# Patient Record
Sex: FEMALE | Race: WHITE | NOT HISPANIC OR LATINO | URBAN - METROPOLITAN AREA
[De-identification: names, ages, dates, MRNs, and addresses within clinical notes are randomized per-mention and may not be internally consistent; named-entity substitution may affect disease eponyms.]

---

## 2017-09-19 ENCOUNTER — FOLLOW UP (OUTPATIENT)
Dept: URBAN - METROPOLITAN AREA CLINIC 33 | Facility: CLINIC | Age: 67
End: 2017-09-19

## 2017-09-19 VITALS — HEART RATE: 98 BPM | DIASTOLIC BLOOD PRESSURE: 78 MMHG | HEIGHT: 55 IN | SYSTOLIC BLOOD PRESSURE: 142 MMHG

## 2017-09-19 DIAGNOSIS — H43.393: ICD-10-CM

## 2017-09-19 DIAGNOSIS — H35.371: ICD-10-CM

## 2017-09-19 DIAGNOSIS — E11.3293: ICD-10-CM

## 2017-09-19 DIAGNOSIS — H25.13: ICD-10-CM

## 2017-09-19 PROCEDURE — G8427 DOCREV CUR MEDS BY ELIG CLIN: HCPCS

## 2017-09-19 PROCEDURE — G8417 CALC BMI ABV UP PARAM F/U: HCPCS

## 2017-09-19 PROCEDURE — 2021F DILAT MACULAR EXAM DONE: CPT

## 2017-09-19 PROCEDURE — G8397 DIL MACULA/FUNDUS EXAM/W DOC: HCPCS

## 2017-09-19 PROCEDURE — 92250 FUNDUS PHOTOGRAPHY W/I&R: CPT

## 2017-09-19 PROCEDURE — 92235 FLUORESCEIN ANGRPH MLTIFRAME: CPT

## 2017-09-19 PROCEDURE — 2022F DILAT RTA XM EVC RTNOPTHY: CPT

## 2017-09-19 PROCEDURE — 1036F TOBACCO NON-USER: CPT

## 2017-09-19 PROCEDURE — 92014 COMPRE OPH EXAM EST PT 1/>: CPT

## 2017-09-19 PROCEDURE — 5010F MACUL RESULT PHY/QHP MNG DM: CPT

## 2017-09-19 ASSESSMENT — VISUAL ACUITY
OS_SC: 20/25
OD_SC: 20/25-2

## 2017-09-19 ASSESSMENT — TONOMETRY
OS_IOP_MMHG: 18
OD_IOP_MMHG: 18

## 2018-08-27 ENCOUNTER — FOLLOW UP (OUTPATIENT)
Dept: URBAN - METROPOLITAN AREA CLINIC 33 | Facility: CLINIC | Age: 68
End: 2018-08-27

## 2018-08-27 VITALS
SYSTOLIC BLOOD PRESSURE: 140 MMHG | WEIGHT: 185 LBS | BODY MASS INDEX: 34.93 KG/M2 | HEART RATE: 66 BPM | HEIGHT: 61 IN | DIASTOLIC BLOOD PRESSURE: 88 MMHG

## 2018-08-27 DIAGNOSIS — E11.3293: ICD-10-CM

## 2018-08-27 DIAGNOSIS — H35.371: ICD-10-CM

## 2018-08-27 DIAGNOSIS — H43.393: ICD-10-CM

## 2018-08-27 PROCEDURE — 92235 FLUORESCEIN ANGRPH MLTIFRAME: CPT

## 2018-08-27 PROCEDURE — 92014 COMPRE OPH EXAM EST PT 1/>: CPT

## 2018-08-27 PROCEDURE — 92250 FUNDUS PHOTOGRAPHY W/I&R: CPT

## 2018-08-27 ASSESSMENT — TONOMETRY
OS_IOP_MMHG: 11
OD_IOP_MMHG: 14

## 2018-08-27 ASSESSMENT — VISUAL ACUITY
OS_SC: 20/25-2
OD_SC: 20/25-2

## 2019-11-20 ENCOUNTER — FOLLOW UP (OUTPATIENT)
Dept: URBAN - METROPOLITAN AREA CLINIC 33 | Facility: CLINIC | Age: 69
End: 2019-11-20

## 2019-11-20 VITALS
DIASTOLIC BLOOD PRESSURE: 80 MMHG | SYSTOLIC BLOOD PRESSURE: 141 MMHG | HEIGHT: 62 IN | HEART RATE: 80 BPM | WEIGHT: 170 LBS | BODY MASS INDEX: 31.28 KG/M2

## 2019-11-20 DIAGNOSIS — H35.371: ICD-10-CM

## 2019-11-20 DIAGNOSIS — E11.3293: ICD-10-CM

## 2019-11-20 DIAGNOSIS — H43.393: ICD-10-CM

## 2019-11-20 PROCEDURE — 92235 FLUORESCEIN ANGRPH MLTIFRAME: CPT

## 2019-11-20 PROCEDURE — 92014 COMPRE OPH EXAM EST PT 1/>: CPT

## 2019-11-20 PROCEDURE — 92250 FUNDUS PHOTOGRAPHY W/I&R: CPT

## 2019-11-20 ASSESSMENT — VISUAL ACUITY
OD_SC: 20/30-1
OS_SC: 20/25+2

## 2019-11-20 ASSESSMENT — TONOMETRY
OD_IOP_MMHG: 15
OS_IOP_MMHG: 17

## 2020-10-30 ENCOUNTER — TELEPHONE (OUTPATIENT)
Dept: DERMATOLOGY | Facility: CLINIC | Age: 70
End: 2020-10-30

## 2020-12-04 ENCOUNTER — FOLLOW UP (OUTPATIENT)
Dept: URBAN - METROPOLITAN AREA CLINIC 26 | Facility: CLINIC | Age: 70
End: 2020-12-04

## 2020-12-04 VITALS
WEIGHT: 180 LBS | BODY MASS INDEX: 33.99 KG/M2 | HEART RATE: 77 BPM | DIASTOLIC BLOOD PRESSURE: 79 MMHG | SYSTOLIC BLOOD PRESSURE: 129 MMHG | HEIGHT: 61 IN

## 2020-12-04 DIAGNOSIS — H43.393: ICD-10-CM

## 2020-12-04 DIAGNOSIS — H35.371: ICD-10-CM

## 2020-12-04 DIAGNOSIS — E11.3213: ICD-10-CM

## 2020-12-04 PROCEDURE — 67210 TREATMENT OF RETINAL LESION: CPT

## 2020-12-04 PROCEDURE — 92014 COMPRE OPH EXAM EST PT 1/>: CPT

## 2020-12-04 PROCEDURE — 92250 FUNDUS PHOTOGRAPHY W/I&R: CPT

## 2020-12-04 PROCEDURE — 92235 FLUORESCEIN ANGRPH MLTIFRAME: CPT

## 2020-12-04 ASSESSMENT — TONOMETRY
OD_IOP_MMHG: 18
OS_IOP_MMHG: 18

## 2020-12-04 ASSESSMENT — VISUAL ACUITY
OS_SC: 20/25-2
OD_SC: 20/25-2

## 2021-01-22 ENCOUNTER — OFFICE VISIT (OUTPATIENT)
Dept: URBAN - METROPOLITAN AREA CLINIC 63 | Facility: CLINIC | Age: 71
End: 2021-01-22

## 2021-02-02 ENCOUNTER — OFFICE VISIT (OUTPATIENT)
Dept: URBAN - METROPOLITAN AREA SURGERY CENTER 4 | Facility: SURGERY CENTER | Age: 71
End: 2021-02-02

## 2021-02-04 LAB — PATHOLOGY (INDENTED REPORT): (no result)

## 2021-02-16 ENCOUNTER — OFFICE VISIT (OUTPATIENT)
Dept: URBAN - METROPOLITAN AREA CLINIC 63 | Facility: CLINIC | Age: 71
End: 2021-02-16

## 2022-03-08 ENCOUNTER — FOLLOW UP (OUTPATIENT)
Dept: URBAN - METROPOLITAN AREA CLINIC 26 | Facility: CLINIC | Age: 72
End: 2022-03-08

## 2022-03-08 VITALS — HEIGHT: 61 IN | WEIGHT: 145 LBS | BODY MASS INDEX: 27.38 KG/M2

## 2022-03-08 DIAGNOSIS — H35.371: ICD-10-CM

## 2022-03-08 DIAGNOSIS — E11.3211: ICD-10-CM

## 2022-03-08 DIAGNOSIS — E11.3212: ICD-10-CM

## 2022-03-08 DIAGNOSIS — H43.393: ICD-10-CM

## 2022-03-08 PROCEDURE — 92014 COMPRE OPH EXAM EST PT 1/>: CPT

## 2022-03-08 PROCEDURE — 92134 CPTRZ OPH DX IMG PST SGM RTA: CPT

## 2022-03-08 ASSESSMENT — TONOMETRY
OS_IOP_MMHG: 16
OD_IOP_MMHG: 14

## 2022-03-08 ASSESSMENT — VISUAL ACUITY
OD_SC: 20/25-2
OS_SC: 20/25-1

## 2022-05-06 ENCOUNTER — OFFICE VISIT (OUTPATIENT)
Dept: URBAN - METROPOLITAN AREA CLINIC 63 | Facility: CLINIC | Age: 72
End: 2022-05-06

## 2022-05-20 ENCOUNTER — OFFICE VISIT (OUTPATIENT)
Dept: URBAN - METROPOLITAN AREA CLINIC 63 | Facility: CLINIC | Age: 72
End: 2022-05-20

## 2022-06-07 ENCOUNTER — OFFICE VISIT (OUTPATIENT)
Dept: URBAN - METROPOLITAN AREA CLINIC 63 | Facility: CLINIC | Age: 72
End: 2022-06-07

## 2022-07-09 ENCOUNTER — TELEPHONE ENCOUNTER (OUTPATIENT)
Dept: URBAN - METROPOLITAN AREA CLINIC 121 | Facility: CLINIC | Age: 72
End: 2022-07-09

## 2022-07-09 RX ORDER — METFORMIN HCL 1000 MG/1
TABLET ORAL
Refills: 0 | OUTPATIENT
Start: 2021-01-22 | End: 2022-05-06

## 2022-07-09 RX ORDER — EMPAGLIFLOZIN, METFORMIN HYDROCHLORIDE 10; 1000 MG/1; MG/1
TABLET, EXTENDED RELEASE ORAL
Refills: 0 | OUTPATIENT
Start: 2021-01-22 | End: 2022-05-06

## 2022-07-09 RX ORDER — CHROMIUM 200 MCG
TABLET ORAL
Refills: 0 | OUTPATIENT
Start: 2021-01-22 | End: 2022-05-06

## 2022-07-09 RX ORDER — COLESTIPOL HYDROCHLORIDE 1 G/1
TWICE A DAY TABLET, FILM COATED ORAL TWICE A DAY
Refills: 0 | OUTPATIENT
Start: 2022-05-13 | End: 2022-06-02

## 2022-07-09 RX ORDER — LOSARTAN POTASSIUM 50 MG/1
TABLET, FILM COATED ORAL
Refills: 0 | OUTPATIENT
Start: 2021-01-22 | End: 2022-05-06

## 2022-07-09 RX ORDER — COLESTIPOL HYDROCHLORIDE 1 G/1
TWICE A DAY TABLET, FILM COATED ORAL TWICE A DAY
Refills: 0 | OUTPATIENT
Start: 2022-05-06 | End: 2022-05-13

## 2022-07-09 RX ORDER — COLESTIPOL HYDROCHLORIDE 1 G/1
TWICE A DAY TABLET, FILM COATED ORAL TWICE A DAY
Refills: 0 | OUTPATIENT
Start: 2022-06-02 | End: 2022-06-17

## 2022-07-09 RX ORDER — PHENAZOPYRIDINE HYDROCHLORIDE 100 MG/1
TABLET, COATED ORAL
Refills: 0 | OUTPATIENT
Start: 2021-01-22 | End: 2022-05-06

## 2022-07-09 RX ORDER — EMPAGLIFLOZIN, METFORMIN HYDROCHLORIDE 25; 1000 MG/1; MG/1
TABLET, EXTENDED RELEASE ORAL
Refills: 0 | OUTPATIENT
Start: 2021-01-22 | End: 2021-01-22

## 2022-07-09 RX ORDER — INSULIN GLARGINE 100 [IU]/ML
INJECTION, SOLUTION SUBCUTANEOUS
Refills: 0 | OUTPATIENT
Start: 2021-01-22 | End: 2022-05-06

## 2022-07-10 ENCOUNTER — TELEPHONE ENCOUNTER (OUTPATIENT)
Dept: URBAN - METROPOLITAN AREA CLINIC 121 | Facility: CLINIC | Age: 72
End: 2022-07-10

## 2022-07-10 RX ORDER — POLYETHYLENE GLYCOL 3350, SODIUM SULFATE ANHYDROUS, SODIUM BICARBONATE, SODIUM CHLORIDE, POTASSIUM CHLORIDE 236; 22.74; 6.74; 5.86; 2.97 G/4L; G/4L; G/4L; G/4L; G/4L
USE AS DIRECTED POWDER, FOR SOLUTION ORAL
Refills: 0 | Status: ACTIVE | COMMUNITY
Start: 2021-01-22

## 2022-07-10 RX ORDER — REPAGLINIDE 1 MG/1
TABLET ORAL THREE TIMES A DAY
Refills: 0 | Status: ACTIVE | COMMUNITY
Start: 2022-05-06

## 2022-07-10 RX ORDER — CHOLESTYRAMINE 4 G/9G
TAKE 4 GRAMS (ONE DOSE) TWICE DAILY POWDER, FOR SUSPENSION ORAL TWICE A DAY
Refills: 2 | Status: ACTIVE | COMMUNITY
Start: 2022-05-24

## 2022-07-10 RX ORDER — COLESTIPOL HYDROCHLORIDE 1 G/1
TWICE A DAY TABLET, FILM COATED ORAL TWICE A DAY
Refills: 2 | Status: ACTIVE | COMMUNITY
Start: 2022-06-17

## 2022-07-10 RX ORDER — VENLAFAXINE HYDROCHLORIDE 75 MG/1
CAPSULE, EXTENDED RELEASE ORAL ONCE A DAY
Refills: 0 | Status: ACTIVE | COMMUNITY
Start: 2022-05-06

## 2022-10-16 ENCOUNTER — ERX REFILL RESPONSE (OUTPATIENT)
Dept: URBAN - METROPOLITAN AREA CLINIC 63 | Facility: CLINIC | Age: 72
End: 2022-10-16

## 2022-10-16 RX ORDER — COLESTIPOL HYDROCHLORIDE 1 G/1
TAKE 1 TABLET BY MOUTH TWICE A DAY TABLET, FILM COATED ORAL
Qty: 180 TABLET | Refills: 0 | OUTPATIENT

## 2022-10-16 RX ORDER — COLESTIPOL HYDROCHLORIDE 1 G/1
TAKE 1 TABLET BY MOUTH TWICE A DAY TABLET, FILM COATED ORAL
Qty: 180 TABLET | Refills: 3 | OUTPATIENT

## 2022-11-02 ENCOUNTER — APPOINTMENT (RX ONLY)
Dept: URBAN - METROPOLITAN AREA CLINIC 147 | Facility: CLINIC | Age: 72
Setting detail: DERMATOLOGY
End: 2022-11-02

## 2022-11-02 DIAGNOSIS — D22 MELANOCYTIC NEVI: ICD-10-CM

## 2022-11-02 DIAGNOSIS — L81.4 OTHER MELANIN HYPERPIGMENTATION: ICD-10-CM

## 2022-11-02 DIAGNOSIS — L82.1 OTHER SEBORRHEIC KERATOSIS: ICD-10-CM

## 2022-11-02 DIAGNOSIS — L82.0 INFLAMED SEBORRHEIC KERATOSIS: ICD-10-CM

## 2022-11-02 DIAGNOSIS — D18.0 HEMANGIOMA: ICD-10-CM

## 2022-11-02 PROBLEM — D18.01 HEMANGIOMA OF SKIN AND SUBCUTANEOUS TISSUE: Status: ACTIVE | Noted: 2022-11-02

## 2022-11-02 PROBLEM — D48.5 NEOPLASM OF UNCERTAIN BEHAVIOR OF SKIN: Status: ACTIVE | Noted: 2022-11-02

## 2022-11-02 PROCEDURE — 69100 BIOPSY OF EXTERNAL EAR: CPT

## 2022-11-02 PROCEDURE — ? FULL BODY SKIN EXAM - DECLINED

## 2022-11-02 PROCEDURE — 11102 TANGNTL BX SKIN SINGLE LES: CPT | Mod: 59

## 2022-11-02 PROCEDURE — 99203 OFFICE O/P NEW LOW 30 MIN: CPT | Mod: 25

## 2022-11-02 PROCEDURE — ? COUNSELING

## 2022-11-02 PROCEDURE — 17110 DESTRUCTION B9 LES UP TO 14: CPT

## 2022-11-02 PROCEDURE — ? LIQUID NITROGEN

## 2022-11-02 PROCEDURE — ? BIOPSY BY SHAVE METHOD

## 2022-11-02 ASSESSMENT — LOCATION DETAILED DESCRIPTION DERM
LOCATION DETAILED: RIGHT FOREHEAD
LOCATION DETAILED: RIGHT INFERIOR CENTRAL MALAR CHEEK
LOCATION DETAILED: RIGHT INFERIOR MEDIAL UPPER BACK
LOCATION DETAILED: LEFT INFERIOR UPPER BACK
LOCATION DETAILED: RIGHT INFERIOR LATERAL MIDBACK
LOCATION DETAILED: RIGHT MEDIAL UPPER BACK

## 2022-11-02 ASSESSMENT — LOCATION ZONE DERM
LOCATION ZONE: FACE
LOCATION ZONE: TRUNK

## 2022-11-02 ASSESSMENT — LOCATION SIMPLE DESCRIPTION DERM
LOCATION SIMPLE: RIGHT CHEEK
LOCATION SIMPLE: RIGHT LOWER BACK
LOCATION SIMPLE: RIGHT UPPER BACK
LOCATION SIMPLE: LEFT UPPER BACK
LOCATION SIMPLE: RIGHT FOREHEAD

## 2022-11-02 NOTE — PROCEDURE: LIQUID NITROGEN
Show Topical Anesthesia Variable?: Yes
Detail Level: Zone
Render Post-Care Instructions In Note?: no
Post-Care Instructions: I reviewed with the patient in detail post-care instructions. Patient is to wear sunprotection, and avoid picking at any of the treated lesions. Pt may apply Vaseline to crusted or scabbing areas.
Medical Necessity Clause: This procedure was medically necessary because the lesions that were treated were:
Spray Paint Text: The liquid nitrogen was applied to the skin utilizing a spray paint frosting technique.
Medical Necessity Information: It is in your best interest to select a reason for this procedure from the list below. All of these items fulfill various CMS LCD requirements except the new and changing color options.
Consent: The patient's consent was obtained including but not limited to risks of crusting, scabbing, blistering, scarring, darker or lighter pigmentary change, recurrence, incomplete removal and infection.

## 2022-12-02 ENCOUNTER — APPOINTMENT (RX ONLY)
Dept: URBAN - METROPOLITAN AREA CLINIC 147 | Facility: CLINIC | Age: 72
Setting detail: DERMATOLOGY
End: 2022-12-02

## 2022-12-02 DIAGNOSIS — L28.1 PRURIGO NODULARIS: ICD-10-CM

## 2022-12-02 DIAGNOSIS — L82.0 INFLAMED SEBORRHEIC KERATOSIS: ICD-10-CM

## 2022-12-02 PROCEDURE — 17110 DESTRUCTION B9 LES UP TO 14: CPT

## 2022-12-02 PROCEDURE — ? COUNSELING

## 2022-12-02 PROCEDURE — 11900 INJECT SKIN LESIONS </W 7: CPT | Mod: 59

## 2022-12-02 PROCEDURE — ? LIQUID NITROGEN

## 2022-12-02 PROCEDURE — ? PRESCRIPTION

## 2022-12-02 PROCEDURE — ? PATHOLOGY DISCUSSION

## 2022-12-02 PROCEDURE — ? INTRALESIONAL KENALOG

## 2022-12-02 RX ORDER — TRIAMCINOLONE ACETONIDE 1 MG/G
CREAM TOPICAL BID
Qty: 30 | Refills: 0 | Status: ERX | COMMUNITY
Start: 2022-12-02

## 2022-12-02 RX ADMIN — TRIAMCINOLONE ACETONIDE: 1 CREAM TOPICAL at 00:00

## 2022-12-02 ASSESSMENT — LOCATION SIMPLE DESCRIPTION DERM
LOCATION SIMPLE: LEFT UPPER BACK
LOCATION SIMPLE: LEFT EAR

## 2022-12-02 ASSESSMENT — LOCATION DETAILED DESCRIPTION DERM
LOCATION DETAILED: LEFT INFERIOR UPPER BACK
LOCATION DETAILED: LEFT INFERIOR HELIX

## 2022-12-02 ASSESSMENT — LOCATION ZONE DERM
LOCATION ZONE: EAR
LOCATION ZONE: TRUNK

## 2022-12-02 NOTE — PROCEDURE: INTRALESIONAL KENALOG
How Many Mls Were Removed From The 80 Mg/Ml (5ml) Vial When Preparing The Injectable Solution?: 0
Concentration Of Solution Injected (Mg/Ml): 5.0
Administered By (Optional): ERGGIE
Total Volume Injected (Ccs- Only Use Numbers And Decimals): .1
Kenalog Preparation: Kenalog
Consent: The risks of atrophy were reviewed with the patient.
Include Z78.9 (Other Specified Conditions Influencing Health Status) As An Associated Diagnosis?: No
Detail Level: Detailed
Medical Necessity Clause: This procedure was medically necessary because the lesions that were treated were:
Validate Note Data When Using Inventory: Yes

## 2023-11-27 ENCOUNTER — TELEPHONE ENCOUNTER (OUTPATIENT)
Dept: URBAN - METROPOLITAN AREA CLINIC 63 | Facility: CLINIC | Age: 73
End: 2023-11-27

## 2023-11-27 RX ORDER — COLESTIPOL HYDROCHLORIDE 1 G/1
2 TABLETS TABLET, FILM COATED ORAL ONCE A DAY
Qty: 60 TABLET | Refills: 0

## 2023-12-13 ENCOUNTER — OFFICE VISIT (OUTPATIENT)
Dept: URBAN - METROPOLITAN AREA CLINIC 63 | Facility: CLINIC | Age: 73
End: 2023-12-13
Payer: MEDICARE

## 2023-12-13 VITALS
WEIGHT: 161 LBS | HEART RATE: 60 BPM | OXYGEN SATURATION: 97 % | HEIGHT: 61 IN | TEMPERATURE: 96.4 F | DIASTOLIC BLOOD PRESSURE: 60 MMHG | BODY MASS INDEX: 30.4 KG/M2 | SYSTOLIC BLOOD PRESSURE: 120 MMHG

## 2023-12-13 DIAGNOSIS — K58.0 IRRITABLE BOWEL SYNDROME WITH DIARRHEA: ICD-10-CM

## 2023-12-13 PROBLEM — 197125005: Status: ACTIVE | Noted: 2023-12-13

## 2023-12-13 PROCEDURE — 99214 OFFICE O/P EST MOD 30 MIN: CPT

## 2023-12-13 RX ORDER — METFORMIN HYDROCHLORIDE 1000 MG/1
1 TABLET WITH A MEAL TABLET, FILM COATED ORAL TWICE A DAY
Status: ACTIVE | COMMUNITY

## 2023-12-13 RX ORDER — COLESTIPOL HYDROCHLORIDE 1 G/1
2 TABLETS TABLET, FILM COATED ORAL ONCE A DAY
OUTPATIENT

## 2023-12-13 RX ORDER — REPAGLINIDE 1 MG/1
TABLET ORAL THREE TIMES A DAY
Refills: 0 | Status: DISCONTINUED | COMMUNITY
Start: 2022-05-06

## 2023-12-13 RX ORDER — COLESTIPOL HYDROCHLORIDE 1 G/1
2 TABLETS TABLET, FILM COATED ORAL ONCE A DAY
Qty: 60 TABLET | Refills: 0 | Status: ACTIVE | COMMUNITY

## 2023-12-13 RX ORDER — CHOLESTYRAMINE 4 G/9G
1 PACKET MIXED WITH WATER OR NON-CARBONATED DRINK POWDER, FOR SUSPENSION ORAL ONCE A DAY
Qty: 30 | Refills: 3 | OUTPATIENT
Start: 2023-12-13

## 2023-12-13 RX ORDER — CHOLESTYRAMINE 4 G/9G
TAKE 4 GRAMS (ONE DOSE) TWICE DAILY POWDER, FOR SUSPENSION ORAL TWICE A DAY
Refills: 2 | Status: ACTIVE | COMMUNITY
Start: 2022-05-24

## 2023-12-13 RX ORDER — INSULIN ASPART 100 [IU]/ML
AS DIRECTED INJECTION, SUSPENSION SUBCUTANEOUS
Status: ACTIVE | COMMUNITY

## 2023-12-13 RX ORDER — POLYETHYLENE GLYCOL 3350, SODIUM SULFATE ANHYDROUS, SODIUM BICARBONATE, SODIUM CHLORIDE, POTASSIUM CHLORIDE 236; 22.74; 6.74; 5.86; 2.97 G/4L; G/4L; G/4L; G/4L; G/4L
USE AS DIRECTED POWDER, FOR SOLUTION ORAL
Refills: 0 | Status: DISCONTINUED | COMMUNITY
Start: 2021-01-22

## 2023-12-13 RX ORDER — VENLAFAXINE HYDROCHLORIDE 75 MG/1
CAPSULE, EXTENDED RELEASE ORAL ONCE A DAY
Refills: 0 | Status: ACTIVE | COMMUNITY
Start: 2022-05-06

## 2023-12-13 NOTE — HPI-TODAY'S VISIT:
Day is a 73-year-old female presenting to the office today for follow-up on IBS-D. 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 However, she states for the past few months it is not working well at all. She is having 6 bowel movements per day and describes them as watery. She has not noticed any alleviating or aggravating factors. She has no other complaints, questions, concerns. She denies fever, unintentional weight loss, nausea/vomiting, constipation, hematochezia, melena, blood when wiping, hematemesis, reflux. . . Colonoscopy 2/2/2021, 5-year recall . Discussed 11/22/2023 labs

## 2024-01-26 ENCOUNTER — ERX REFILL RESPONSE (OUTPATIENT)
Dept: URBAN - METROPOLITAN AREA CLINIC 63 | Facility: CLINIC | Age: 74
End: 2024-01-26

## 2024-01-26 RX ORDER — CHOLESTYRAMINE 4 G/9G
1 PACKET MIXED WITH WATER OR NON-CARBONATED DRINK ORALLY ONCE A DAY 30 DAYS POWDER, FOR SUSPENSION ORAL
Qty: 90 PACKET | Refills: 1 | OUTPATIENT

## 2024-01-26 RX ORDER — CHOLESTYRAMINE 4 G/9G
1 PACKET MIXED WITH WATER OR NON-CARBONATED DRINK POWDER, FOR SUSPENSION ORAL ONCE A DAY
Qty: 30 | Refills: 3 | OUTPATIENT

## 2024-02-14 ENCOUNTER — OV EP (OUTPATIENT)
Dept: URBAN - METROPOLITAN AREA CLINIC 63 | Facility: CLINIC | Age: 74
End: 2024-02-14

## 2024-03-21 ENCOUNTER — FOLLOW UP (OUTPATIENT)
Dept: URBAN - METROPOLITAN AREA CLINIC 26 | Facility: CLINIC | Age: 74
End: 2024-03-21

## 2024-03-21 VITALS
BODY MASS INDEX: 29.64 KG/M2 | SYSTOLIC BLOOD PRESSURE: 98 MMHG | HEIGHT: 61 IN | HEART RATE: 160 BPM | WEIGHT: 157 LBS | DIASTOLIC BLOOD PRESSURE: 82 MMHG

## 2024-03-21 DIAGNOSIS — H02.831: ICD-10-CM

## 2024-03-21 DIAGNOSIS — H43.393: ICD-10-CM

## 2024-03-21 DIAGNOSIS — H35.371: ICD-10-CM

## 2024-03-21 DIAGNOSIS — H02.834: ICD-10-CM

## 2024-03-21 DIAGNOSIS — E11.3313: ICD-10-CM

## 2024-03-21 DIAGNOSIS — H25.13: ICD-10-CM

## 2024-03-21 DIAGNOSIS — H53.033: ICD-10-CM

## 2024-03-21 PROCEDURE — 92014 COMPRE OPH EXAM EST PT 1/>: CPT

## 2024-03-21 PROCEDURE — 92250 FUNDUS PHOTOGRAPHY W/I&R: CPT

## 2024-03-21 PROCEDURE — 92134 CPTRZ OPH DX IMG PST SGM RTA: CPT

## 2024-03-21 ASSESSMENT — VISUAL ACUITY
OS_PH: 20/30-2
OD_SC: 20/30+1
OS_SC: 20/40-1

## 2024-03-21 ASSESSMENT — TONOMETRY
OD_IOP_MMHG: 21
OS_IOP_MMHG: 13

## 2024-04-02 ENCOUNTER — OV EP (OUTPATIENT)
Dept: URBAN - METROPOLITAN AREA CLINIC 63 | Facility: CLINIC | Age: 74
End: 2024-04-02
Payer: MEDICARE

## 2024-04-02 VITALS
WEIGHT: 161 LBS | DIASTOLIC BLOOD PRESSURE: 68 MMHG | SYSTOLIC BLOOD PRESSURE: 122 MMHG | HEIGHT: 61 IN | HEART RATE: 69 BPM | BODY MASS INDEX: 30.4 KG/M2 | OXYGEN SATURATION: 98 % | TEMPERATURE: 97 F

## 2024-04-02 DIAGNOSIS — K58.0 IRRITABLE BOWEL SYNDROME WITH DIARRHEA: ICD-10-CM

## 2024-04-02 PROCEDURE — 99213 OFFICE O/P EST LOW 20 MIN: CPT

## 2024-04-02 RX ORDER — METFORMIN HYDROCHLORIDE 1000 MG/1
TAKE 1 TABLET BY MOUTH TWICE A DAY WITH FOOD TABLET, FILM COATED ORAL
Qty: 180 EACH | Refills: 0 | Status: ACTIVE | COMMUNITY

## 2024-04-02 RX ORDER — INSULIN ASPART 100 [IU]/ML
INJECT 5 UNITS THREE TIMES DAILY BEFORE MEALS .DXCODE :E11.65 INJECTION, SOLUTION INTRAVENOUS; SUBCUTANEOUS
Qty: 15 MILLILITER | Refills: 0 | Status: ACTIVE | COMMUNITY

## 2024-04-02 RX ORDER — INSULIN DETEMIR 100 [IU]/ML
INJECTION, SOLUTION SUBCUTANEOUS
Qty: 15 MILLILITER | Status: ACTIVE | COMMUNITY

## 2024-04-02 RX ORDER — CHOLESTYRAMINE 4 G/9G
1 PACKET MIXED WITH WATER OR NON-CARBONATED DRINK ORALLY ONCE A DAY 30 DAYS 90 POWDER, FOR SUSPENSION ORAL
Qty: 90 EACH | Refills: 0 | Status: ACTIVE | COMMUNITY

## 2024-04-02 NOTE — HPI-TODAY'S VISIT:
Day is a 73-year-old female presenting to the office today for follow-up on IBS-D. At her last visit we switched her from colestipol to cholestyramine. She states that this was helpful but she felt like it was making her more constipated. She notes that she was continuing to have 2-3 bowel movements per day, sometimes 4 and most of them were still on the looser side describing some as applesauce like and others as watery. However, on the cholestyramine she would have some bowel movements that were fully formed. These were normally followed by watery stool as well. This has been an ongoing problem since her gallbladder was removed 2-3 years ago. She states she has also had multiple accidents where she felt as if she could not stop herself from having a bowel movement. Last week she noticed that after taking cholestyramine daily she was having a few days where she felt "backed up and like everything got stuck". So, she stopped taking cholestyramine and had regular bowel movements after this and continues to have regular bowel movements. She also took Gas-X which helped as well. Otherwise, she has no GI complaints, questions, concerns at this time. She denies melena, hematochezia, bright red blood per rectum, nausea/vomiting, hematemesis, dysphagia, reflux, change in bowel habits, change in stool caliber, unintentional weight loss/weight gain. She states that she eats dairy daily. She denies nocturnal symptoms, recent antibiotic use, sick contacts, acholic stools, jaundice. She denies abdominal pain but states that she will get abdominal discomfort/cramping that will be relieved with bowel movements. . Colonoscopy 2/2/2021; - Nonbleeding internal hemorrhoids - 4 mm polyp in the sigmoid colon. Resected and retrieved. - 5 mm polyp in the rectum. Resected and treated. - Pathology; - Sigmoid colon polypectomy; tubular adenoma - Rectum polypectomy; hyperplastic polyp - 5-year recall for surveillance . 1/16/2024 labs; - CBC within normal limits - CMP; glucose 138, BUN 30, BUN/creatinine ratio 49, total protein 5.9, otherwise within normal limits - Lipid panel within normal limits - Hemoglobin A1c; 6.8 (high)

## 2024-05-22 ENCOUNTER — OFFICE VISIT (OUTPATIENT)
Dept: CARDIOLOGY CLINIC | Facility: CLINIC | Age: 74
End: 2024-05-22
Payer: MEDICARE

## 2024-05-22 VITALS
WEIGHT: 159 LBS | DIASTOLIC BLOOD PRESSURE: 78 MMHG | BODY MASS INDEX: 30.02 KG/M2 | HEART RATE: 58 BPM | HEIGHT: 61 IN | SYSTOLIC BLOOD PRESSURE: 126 MMHG

## 2024-05-22 DIAGNOSIS — I50.32 CHRONIC DIASTOLIC CONGESTIVE HEART FAILURE (HCC): Primary | ICD-10-CM

## 2024-05-22 DIAGNOSIS — I48.0 PAROXYSMAL A-FIB (HCC): ICD-10-CM

## 2024-05-22 PROBLEM — E11.9 TYPE 2 DIABETES MELLITUS WITHOUT COMPLICATION, WITH LONG-TERM CURRENT USE OF INSULIN (HCC): Status: ACTIVE | Noted: 2024-05-22

## 2024-05-22 PROBLEM — Z79.4 TYPE 2 DIABETES MELLITUS WITHOUT COMPLICATION, WITH LONG-TERM CURRENT USE OF INSULIN (HCC): Status: ACTIVE | Noted: 2024-05-22

## 2024-05-22 PROBLEM — I25.10 NON-OCCLUSIVE CORONARY ARTERY DISEASE: Status: ACTIVE | Noted: 2024-05-22

## 2024-05-22 PROCEDURE — 93000 ELECTROCARDIOGRAM COMPLETE: CPT | Performed by: INTERNAL MEDICINE

## 2024-05-22 PROCEDURE — 99204 OFFICE O/P NEW MOD 45 MIN: CPT | Performed by: INTERNAL MEDICINE

## 2024-05-22 RX ORDER — INSULIN GLARGINE 100 [IU]/ML
25 INJECTION, SOLUTION SUBCUTANEOUS DAILY
COMMUNITY
Start: 2024-05-13

## 2024-05-22 RX ORDER — CHOLESTYRAMINE 4 G/9G
4 POWDER, FOR SUSPENSION ORAL DAILY
COMMUNITY

## 2024-05-22 RX ORDER — VENLAFAXINE HYDROCHLORIDE 37.5 MG/1
37.5 CAPSULE, EXTENDED RELEASE ORAL DAILY
COMMUNITY
Start: 2024-04-18

## 2024-05-22 RX ORDER — TORSEMIDE 5 MG/1
5 TABLET ORAL DAILY
COMMUNITY
Start: 2024-04-06

## 2024-05-22 RX ORDER — DIPHENOXYLATE HYDROCHLORIDE AND ATROPINE SULFATE 2.5; .025 MG/1; MG/1
1 TABLET ORAL DAILY
COMMUNITY

## 2024-05-22 RX ORDER — METOPROLOL SUCCINATE 25 MG/1
25 TABLET, EXTENDED RELEASE ORAL DAILY
COMMUNITY
Start: 2024-04-23

## 2024-05-22 RX ORDER — SPIRONOLACTONE 25 MG/1
25 TABLET ORAL DAILY
COMMUNITY

## 2024-05-22 NOTE — PROGRESS NOTES
[facility] Cardiology  Office Consultation  Sheba Bowers 73 y.o. female MRN: [id number]        Chief Complaint    Chief Complaint   Patient presents with    Advice Only     Pt has cardioversion in [place]- around April of this year.  Has been feeling tired/not motivated.  Lives in [place] for summer- goes between here and [place]       Referring Provider: Antonia Joy LCSW    Impression & Plan:    1. Chronic diastolic congestive heart failure (HCC)  She is euvolemic, compensated  Continue current therapy  - POCT ECG    2. Paroxysmal A-fib (HCC)  She is in sinus bradycardia today, 58 bpm, on only metoprolol XL 25 mg daily. When she was admitted with AF, her rates were > 160 bpm. I do not think metoprolol XL 25 mg daily will adequately control her rates if she were to revert to AF. 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  Rhythm control would likely be the best option.  However, due to her bradycardia she would probably not tolerate flecainide therapy.  Class III such as Tikosyn or sotalol can be considered, although sotalol especially may cause issues with bradycardia.  Ultimately, catheter ablation is likely the best option.  She is agreeable, we will send her to electrophysiology for consultation.  - POCT ECG  - Ambulatory referral to Cardiac Electrophysiology; Future            We will see Sheba Bowers back in 5 months for routine follow-up.    HPI: Sheba Bowers is a 73 y.o. year old female with PAF, IDDM2, HFpEF, nonobstructive CAD, obesity who presents today to establish care with cardiology locally.    She splits her year between [place] and [place]. In [place] she sees Dr. Charles Santiago with [facility] at [facility], [place].     She was admitted on 4/3/2024 to [facility] in the [facility] for atrial fibrillation with RVR.  She was started on Eliquis for thromboembolic prophylaxis as well as rate control on metoprolol and diltiazem.   On 4/5/2024, she underwent DC cardioversion with successful restoration of sinus rhythm.  For CHF, she was started on oral torsemide and spironolactone.    The patient states she is sure she reverted to AF once she returned home and remained in AF for several days then she felt it resolve. Today she is in sinus rhythm.           Cardiac testing:     NM SPECT 10/5/23  Perfusion Comments   A 1-day rest/stress gated SPECT myocardial perfusion imaging protocol was performed. Overall image quality is good. The stress/rest perfusion TID ratio is 1.12. Myocardial perfusion imaging is normal. There is no evidence of inducible ischemia. There is a prior study available for comparison. As compared to the previous study, there are no changes.     Perfusion Conclusion   This is a low risk nuclear stress test.     Stress Function Comments   Left ventricular wall motion post-stress was normal. Post-stress left ventricular ejection fraction is 64%. LV Systolic function is normal .        TISHA 4/5/24 ([facility], [place])    The left ventricular EF by visual approximation is 50-55%. Normal left   ventricular systolic function.     Right ventricular systolic function is normal.     There is trace aortic valve regurgitation.     There is mild to moderate mitral valve regurgitation.     There is mild tricuspid valve regurgitation.     There is no thrombus in the left atrial cavity or appendage.        Echo Limited 4/4/24 ([facility], [place])    The left ventricular EF by visual approximation is 50-55%. Preserved   left ventricular systolic function.     Right ventricular systolic function is normal.     There is mild aortic valve regurgitation.     There is mild mitral valve regurgitation.      Echo 10/5/23 ([facility], [place])    Left ventricle cavity appears normal.     Left ventricular wall thickness is normal.     The left ventricular EF by visual approximation is 55-60%. Normal left   ventricular systolic function.     There is grade III left  ventricular diastolic dysfunction.     Left ventricular wall motion is within normal limits.     There is trace aortic valve regurgitation with centrally directed jet.     There is mild mitral valve regurgitation with a centrally directed jet.     There is trace tricuspid valve regurgitation.     Unable to assess RVSP due to lack of significant tricuspid   regurgitation.    There is trace pulmonic valve regurgitation.     There is a trivial pericardial effusion.     There is no echocardiographic evidence of tamponade.     No previous echocardiogram available to compare.          EKG reviewed personally:   EKG 5/22/2024--sinus bradycardia, 58 bpm, normal axis, low voltage, nonspecific T wave abnormality.  Abnormal study.      Relevant Laboratory Studies:  (Laboratory studies personally reviewed)  BMP 4/6/24 - K 3.7, Cr 0.65    Review of Systems      History reviewed. No pertinent past medical history.  History reviewed. No pertinent surgical history.  Social History     Substance and Sexual Activity   Alcohol Use None     Social History     Substance and Sexual Activity   Drug Use Not on file     Social History     Tobacco Use   Smoking Status Not on file   Smokeless Tobacco Not on file     History reviewed. No pertinent family history.    Allergies:  Allergies   Allergen Reactions    Nickel Rash     Skin irritation    Latex Itching     Hands turn red       Medications (as of START of this encounter):   Outpatient Medications Prior to Visit   Medication Sig Dispense Refill    apixaban (ELIQUIS) 5 mg Take 5 mg by mouth 2 (two) times a day      Basaglar KwikPen 100 units/mL SOPN Inject 25 Units under the skin daily      cholestyramine (QUESTRAN) 4 g packet Take 4 g by mouth daily      insulin aspart (NovoLOG) 100 Units/mL injection pen Inject 5 Units under the skin 3 (three) times a day before meals      metFORMIN (GLUCOPHAGE) 500 mg tablet Take 500 mg by mouth in the morning      metoprolol succinate (TOPROL-XL) 25 mg  "24 hr tablet Take 25 mg by mouth daily      multivitamin (THERAGRAN) TABS Take 1 tablet by mouth daily      spironolactone (ALDACTONE) 25 mg tablet Take 25 mg by mouth daily      torsemide (DEMADEX) 5 MG tablet Take 5 mg by mouth daily      venlafaxine (EFFEXOR-XR) 37.5 mg 24 hr capsule Take 37.5 mg by mouth daily      Pediatric Multivitamins-Fl (MULTI VIT/FL PO) daily       No facility-administered medications prior to visit.         Vitals:    05/22/24 1348   BP: 126/78   Pulse: 58     Weight (last 2 days)       Date/Time Weight    05/22/24 1348 72.1 (159)            General: Sheba Bowers is a well appearing female, in no acute distress, sitting comfortably  HEENT: moist mucous membranes, EOMI  Neck:  No JVD, supple, trachea midline   Cardiovascular: unremarkable S1/S2, regular rate and rhythm, no murmurs, rubs or gallops   Pulmonary: normal respiratory effort, CTAB   Abdomen: soft and nondistended  Extremities: No lower extremity edema. Warm and well perfused extremities.   Neuro: no focal motor deficits, AAOx3 (person, place, time)  Psych: Normal mood and affect, cooperative        Time Spent:  Total time (face-to-face and non-face-to-face) spent on today's visit was 35 minutes. This includes preparation for the visits (i.e. reviewing test results), performance of a medically appropriate history and examination, and orders for medications, tests or other procedures. This time is exclusive of procedures performed and time spent teaching.    Devin Buck MD    This note was completed in part utilizing ED01 recognition software. Grammatical errors, random word insertion, spelling mistakes, occasional wrong word or \"sound-alike\" substitutions and incomplete sentences may be an occasional consequence of the system secondary to software limitations, ambient noise and hardware issues. At the time of dictation, efforts were made to edit, clarify and /or correct errors.  Please read the chart " carefully and recognize, using context, where substitutions have occurred.  If you have any questions or concerns about the context, text or information contained within the body of this dictation, please contact myself, the provider, for further clarification.

## 2024-06-27 ENCOUNTER — TELEPHONE (OUTPATIENT)
Age: 74
End: 2024-06-27

## 2024-06-27 ENCOUNTER — TELEPHONE (OUTPATIENT)
Dept: CARDIOLOGY CLINIC | Facility: CLINIC | Age: 74
End: 2024-06-27

## 2024-06-27 NOTE — TELEPHONE ENCOUNTER
Patient is scheduled for surgery on 7/1 for repair fracture of left distal radius.    She is on Eliquis. Surgeon, Dr Praful Michele is requesting a 3-5 day hold on Eliquis.    Please advise if ok to hold.    Fax # for response.:  [phone]

## 2024-06-27 NOTE — TELEPHONE ENCOUNTER
Spoke with patient and she is aware that her 9/30/2024 appointment with Dr Pacheco is cancelled.  Patient stated she was busy at the time of our phone call and stated that she will call back to reschedule for Dr Buck's next available appointment

## 2024-06-28 NOTE — TELEPHONE ENCOUNTER
Tomasa called again, warm transferred to [facility]. Gillian took the call.

## 2024-06-28 NOTE — TELEPHONE ENCOUNTER
Tomasa from [facility] calls requesting the Eliquis hold and CC.  She is leaving for the day, so she requests pt be called directly with hold instructions.

## 2024-06-28 NOTE — TELEPHONE ENCOUNTER
Called, spoke to pt and advised her of Dr. Buck's message   Pt verbalized understanding.   Left a VM for Tomasa @  [facility].

## 2024-06-28 NOTE — TELEPHONE ENCOUNTER
Please address,     [facility] office looking for CC and Eliquis hold within next hour     Pt of Dr. Buck

## 2024-08-05 NOTE — PROGRESS NOTES
Consultation - Electrophysiology-Cardiology (EP)   Sheba Bowers 73 y.o. female MRN: [id number]  Unit/Bed#:  Encounter: [id number]      1. Paroxysmal A-fib (HCC)  Ambulatory referral to Cardiac Electrophysiology    POCT ECG      2. Chronic diastolic congestive heart failure (HCC)        3. Non-occlusive coronary artery disease        4. Type 2 diabetes mellitus without complication, with long-term current use of insulin (HCC)        5. Current use of long term anticoagulation        6. Primary hypertension        7. Class 1 obesity due to excess calories with serious comorbidity and body mass index (BMI) of 30.0 to 30.9 in adult              Consults  Physician Requesting Consult: Devin Buck, *   Reason for Consult / Principal Problem: PAF & discuss antiarrhythmic therapy         Summary of my recommendation for the patient  Patient has a history of intermittent/paroxysmal A-fib for about 2 to 3 years  She does have a recent episode with RVR, that lasted longer -early persistent and needed to be cardioverted -she has been back in A-fib from time to time since then    Risk factors-age 73, BMI 30, sleep apnea, diabetes mellitus, hypertension, Patient does feel the palpitation and go into diastolic heart failure when she is in RVR    Current management  - Anticoagulation-on apixaban   - rate controlled-metoprolol-higher dose limited by extreme fatigue  - Rhythm control-none at the current time  - propafenone and sotalol avoided due to risk of further bradycardia, flecainide use will necessitate use of beta-blockers, dofetilide will need to be priced, amiodarone is avoided as a fast drug    Patient is in agreement to proceed with ablation  Risks and benefits explained  About 70% chance of success in the first year with ablation  Small but definite risk of bleeding at site of access, heart attack, stroke, bleeding around the heart, esophageal injury, phrenic nerve injury, diaphragmatic palsy, atrial  esophageal fistula, pulmonary vein stenosis    Patient is being recommended PFA  This is an approved method by FDA and is associated with lesser risk of pericarditis, phrenic nerve injury and diaphragm palsy, esophageal injury and atrial esophageal fistula    My recommendation for the patient  CT pulmonary vein  TISHA before the procedure  PFA  NavX  Hold Eliquis the morning of the procedure    Please set up the procedure as soon as possible as patient will need to go back to [place] in September    Price dofetilide 500 mcg twice daily and sotalol 160 mg twice daily    Plan for Assert /Loop post ablation for continual monitoring      Clinical conditions  Atrial fibrillation, paroxysmal -episodes since last 2 years   DC cardioversion in [place] on April 2024  Underlying bradycardia from time to time, sick sinus syndrome  Diastolic heart failure  Nonocclusive CAD  Diabetes mellitus on insulin  Obesity post bypass          Assessment & Plan     Atrial fibrillation, paroxysmal/early persistent-episodes since last 2 years   DC cardioversion in [place] on April 2024  Underlying bradycardia from time to time, sick sinus syndrome  Diastolic heart failure    Patient has a history of intermittent/paroxysmal A-fib for about 2 to 3 years  She does have a recent episode with RVR, that lasted longer -early persistent and needed to be cardioverted -she has been back in A-fib from time to time since then    Risk factors-age 73, BMI 30, sleep apnea, diabetes mellitus, hypertension, Patient does feel the palpitation and go into diastolic heart failure when she is in RVR    Current management  - Anticoagulation-on apixaban   - rate controlled-metoprolol-higher dose limited by extreme fatigue  - Rhythm control-none at the current time  - propafenone and sotalol avoided due to risk of further bradycardia, flecainide use will necessitate use of beta-blockers, dofetilide will need to be priced, amiodarone is avoided as a fast  drug    Patient is in agreement to proceed with ablation  Risks and benefits explained  About 70% chance of success in the first year with ablation  Small but definite risk of bleeding at site of access, heart attack, stroke, bleeding around the heart, esophageal injury, phrenic nerve injury, diaphragmatic palsy, atrial esophageal fistula, pulmonary vein stenosis    Patient is being recommended PFA  This is an approved method by FDA and is associated with lesser risk of pericarditis, phrenic nerve injury and diaphragm palsy, esophageal injury and atrial esophageal fistula    My recommendation for the patient  CT pulmonary vein  TISHA before the procedure  PFA  NavX  Hold Eliquis the morning of the procedure    Please set up the procedure as soon as possible as patient will need to go back to [place] in September          Nonocclusive CAD  No angina at this time      Diabetes mellitus on insulin  Hemoglobin A1c is elevated  Needs further aggressive management      Obesity post bypass  BMI is 30        History of Present Illness   HPI: Sheba Bowers is a 73 y.o. year old female has been referred to me by Dr Buck for the evaluation and management of atrial fibrillation      The patient has significant medical illnesses which include  Atrial fibrillation, paroxysmal -episodes since last 2 years   DC cardioversion in [place] on April 2024  Underlying bradycardia from time to time, sick sinus syndrome  Diastolic heart failure  Nonocclusive CAD  Diabetes mellitus on insulin  Obesity post bypass      trial fibrillation, paroxysmal/early persistent-episodes since last 2 years   DC cardioversion in [place] on April 2024  Underlying bradycardia from time to time, sick sinus syndrome  Diastolic heart failure    Patient has a history of intermittent/paroxysmal A-fib for about 2 to 3 years  She does have a recent episode with RVR, that lasted longer -early persistent and needed to be cardioverted -she has been back in A-fib  from time to time since then    Risk factors-age 73, BMI 30, sleep apnea, diabetes mellitus, hypertension, Patient does feel the palpitation and go into diastolic heart failure when she is in RVR    She is not complaining of anginal-like chest pain at this time  There is occasional orthopnea and PND along with leg swelling  She has been admitted to the hospital and diuresed for the same  When she is in RVR she feels the palpitation  There has been no syncope  There is exertional intolerance    She does have intermittent snoring morning fatigue and daytime sleepiness    She does not smoke  She uses occasional alcohol  She does not use any recreational drug        Historical Information   History reviewed. No pertinent past medical history.  History reviewed. No pertinent surgical history.  Social History     Substance and Sexual Activity   Alcohol Use None     Social History     Substance and Sexual Activity   Drug Use Not on file     Social History     Tobacco Use   Smoking Status Not on file   Smokeless Tobacco Not on file     Social History     Socioeconomic History    Marital status: Unknown     Spouse name: Not on file    Number of children: Not on file    Years of education: Not on file    Highest education level: Not on file   Occupational History    Not on file   Tobacco Use    Smoking status: Not on file    Smokeless tobacco: Not on file   Substance and Sexual Activity    Alcohol use: Not on file    Drug use: Not on file    Sexual activity: Not on file   Other Topics Concern    Not on file   Social History Narrative    Not on file     Social Determinants of Health     Financial Resource Strain: Low Risk  (1/15/2024)    Received from [facility]    Overall Financial Resource Strain (CARDIA)     Difficulty of Paying Living Expenses: Not very hard   Food Insecurity: No Food Insecurity (4/4/2024)    Received from [facility]    Hunger Vital Sign     Worried About Running Out of Food in the Last Year: Never true     " Ran Out of Food in the Last Year: Never true   Transportation Needs: No Transportation Needs (4/4/2024)    Received from [facility]    PRAPARE - Transportation     Lack of Transportation (Medical): No     Lack of Transportation (Non-Medical): No   Physical Activity: Insufficiently Active (1/15/2024)    Received from [facility]    Exercise Vital Sign     Days of Exercise per Week: 3 days     Minutes of Exercise per Session: 40 min   Stress: No Stress Concern Present (1/15/2024)    Received from [facility]    [place] [employer] - Occupational Stress Questionnaire     Feeling of Stress : Only a little   Social Connections: Moderately Isolated (1/15/2024)    Received from [facility]    Social Connection and Isolation Panel [NHANES]     Frequency of Communication with Friends and Family: More than three times a week     Frequency of Social Gatherings with Friends and Family: Twice a week     Attends Rastafarian Services: 1 to 4 times per year     Active Member of Clubs or Organizations: No     Attends Club or Organization Meetings: Patient declined     Marital Status:    Intimate Partner Violence: Not At Risk (4/4/2024)    Received from [facility]    Humiliation, Afraid, Rape, and Kick questionnaire     Fear of Current or Ex-Partner: No     Emotionally Abused: No     Physically Abused: No     Sexually Abused: No   Housing Stability: Not on file     .  Family History:History reviewed. No pertinent family history.      Meds/Allergies      No current facility-administered medications for this visit.        Not in a hospital admission.    Allergies   Allergen Reactions    Nickel Rash     Skin irritation    Latex Itching     Hands turn red           Objective   Vitals: Visit Vitals  Ht 5' 1\" (1.549 m)   BMI 30.04 kg/m²   BSA 1.71 m²      There were no vitals filed for this visit.[unfilled]    Invasive Devices       None                     ROS  Review of Systems   All other systems reviewed and are " negative.  As described in my history of present illness        PHYSICAL EXAM  Physical Exam  Vitals reviewed.   Constitutional:       General: She is not in acute distress.     Appearance: Normal appearance. She is obese. She is not ill-appearing.   HENT:      Head: Normocephalic and atraumatic.      Right Ear: External ear normal.      Left Ear: External ear normal.      Nose: Nose normal.      Mouth/Throat:      Comments: Posterior pharynx is crowded  Eyes:      General: No scleral icterus.     Extraocular Movements: Extraocular movements intact.      Conjunctiva/sclera: Conjunctivae normal.      Pupils: Pupils are equal, round, and reactive to light.   Cardiovascular:      Rate and Rhythm: Normal rate and regular rhythm.      Pulses: Normal pulses.      Heart sounds: Normal heart sounds. No murmur heard.  Pulmonary:      Effort: Pulmonary effort is normal. No respiratory distress.      Breath sounds: Normal breath sounds. No wheezing.   Abdominal:      General: Bowel sounds are normal. There is no distension.      Tenderness: There is no abdominal tenderness.      Comments: Central obesity present   Musculoskeletal:         General: No swelling, tenderness or deformity.      Cervical back: No rigidity.   Skin:     Coloration: Skin is not jaundiced.      Findings: No bruising.   Neurological:      Mental Status: She is alert and oriented to person, place, and time. Mental status is at baseline.      Motor: No weakness.   Psychiatric:         Mood and Affect: Mood normal.         Behavior: Behavior normal.         Thought Content: Thought content normal.         Judgment: Judgment normal.               LAB RESULTS:    CBC:  Component  Ref Range & Units 6/28/24  1:38 PM   Hemoglobin  11.5 - 14.5 g/dL 13.4   Hematocrit  35.0 - 43.0 % 39.1   WBC  4.0 - 10.0 thou/cmm 8.2   RBC  3.70 - 4.70 mill/cmm 4.22   Platelet  140 - 350 thou/cmm 282   MPV  7.5 - 11.3 fL 9.1   MCV  80 - 100 fL 93   MCH  26.0 - 34.0 pg 31.8  "  MCHC  32.0 - 37.0 g/dL 34.4   RDW  12.0 - 16.0 % 14.1   Differential Type AUTO   Absolute Neutrophils  1.8 - 7.8 thou/cmm 5.0   Absolute Lymphocytes  1.0 - 3.0 thou/cmm 2.1   Absolute Monocytes  0.3 - 1.0 thou/cmm 0.8   Absolute Eosinophils  0.0 - 0.5 thou/cmm 0.2   Absolute Basophils  0.0 - 0.1 thou/cmm 0.1   Neutrophils  % 61   Lymphocytes Manual  % 26   Monocytes  % 10   Eosinophils  % 2   Basophils  % 1       CMP:  Potassium   Date Value Ref Range Status   06/28/2024 4.5 3.5 - 5.2 mmol/L Final     Chloride   Date Value Ref Range Status   06/28/2024 105 100 - 109 mmol/L Final     Carbon Dioxide   Date Value Ref Range Status   06/28/2024 25 21 - 31 mmol/L Final     BUN   Date Value Ref Range Status   06/28/2024 21 7 - 25 mg/dL Final     Creatinine   Date Value Ref Range Status   06/28/2024 0.71 0.40 - 1.10 mg/dL Final     Calcium   Date Value Ref Range Status   06/28/2024 9.8 8.5 - 10.1 mg/dL Final     eGFRcr   Date Value Ref Range Status   06/28/2024 88 >59 Final        Magnesium:   Component  Ref Range & Units 4/6/24 12:44 AM   Magnesium  1.6 - 2.3 mg/dL 1.7       A1C:  Component  Ref Range & Units 4/27/21  5:42 AM   Hemoglobin A1c  <5.7 % 8.8 High        TSH:  Component  Ref Range & Units 4/3/24  8:26 PM   TSH, Ultrasensitive  0.465 - 4.827 mIU/L 2.050       PT/INR:  Component  Ref Range & Units 4/3/24  8:26 PM   Prothrombin Time (PT)  11.9 - 14.6 sec 17.3 High    INR 1.38       Lipid Panel:  No results found for: \"CHOLESTEROL\", \"TRIG\", \"HDL\", \"LDLCAL\", \"NONHDLC\"    Troponin:  No results found for: \"TROPONINI\"      Imaging:    NM SPECT 10/5/23  Perfusion Comments   A 1-day rest/stress gated SPECT myocardial perfusion imaging protocol was performed. Overall image quality is good. The stress/rest perfusion TID ratio is 1.12. Myocardial perfusion imaging is normal. There is no evidence of inducible ischemia. There is a prior study available for comparison. As compared to the previous study, there are no changes. "     Perfusion Conclusion   This is a low risk nuclear stress test.     Stress Function Comments   Left ventricular wall motion post-stress was normal. Post-stress left ventricular ejection fraction is 64%. LV Systolic function is normal .          TISHA 4/5/24 ([place], [place])    The left ventricular EF by visual approximation is 50-55%. Normal left   ventricular systolic function.     Right ventricular systolic function is normal.     There is trace aortic valve regurgitation.     There is mild to moderate mitral valve regurgitation.     There is mild tricuspid valve regurgitation.     There is no thrombus in the left atrial cavity or appendage.          Echo Limited 4/4/24 ([facility], [place])    The left ventricular EF by visual approximation is 50-55%. Preserved   left ventricular systolic function.     Right ventricular systolic function is normal.     There is mild aortic valve regurgitation.     There is mild mitral valve regurgitation.       Echo 10/5/23 ([facility], [place])    Left ventricle cavity appears normal.     Left ventricular wall thickness is normal.     The left ventricular EF by visual approximation is 55-60%. Normal left   ventricular systolic function.     There is grade III left ventricular diastolic dysfunction.     Left ventricular wall motion is within normal limits.     There is trace aortic valve regurgitation with centrally directed jet.     There is mild mitral valve regurgitation with a centrally directed jet.     There is trace tricuspid valve regurgitation.     Unable to assess RVSP due to lack of significant tricuspid   regurgitation.    There is trace pulmonic valve regurgitation.     There is a trivial pericardial effusion.     There is no echocardiographic evidence of tamponade.     No previous echocardiogram available to compare.             EKG reviewed personally:   EKG 5/22/2024--sinus bradycardia, 58 bpm, normal axis, low voltage, nonspecific T wave abnormality.  Abnormal study.        [id number]        TISHA:  No results found for this or any previous visit.      Echocardiogram:  No results found for this or any previous visit.      Stress Test:   No results found for this or any previous visit.      Cardiac Catheterization:  No results found for this or any previous visit.      HOLTER MONITOR: 24 HOUR/48 HOUR MONITORS  No results found for this or any previous visit.      AMB Extended Holter Monitor: Zio XT/AT or BioTel  No results found for this or any previous visit.        DEVICE CHECK:     No results found for this or any previous visit.         Code Status: [unfilled]  Advance Directive and Living Will:      Power of :    POLST:      Counseling / Coordination of Care    Detailed discussion done with regards to atrial fibrillation ablation    Bernabe Figueroa MD

## 2024-08-06 ENCOUNTER — TELEPHONE (OUTPATIENT)
Dept: CARDIOLOGY CLINIC | Facility: CLINIC | Age: 74
End: 2024-08-06

## 2024-08-06 ENCOUNTER — CONSULT (OUTPATIENT)
Dept: CARDIOLOGY CLINIC | Facility: CLINIC | Age: 74
End: 2024-08-06
Payer: MEDICARE

## 2024-08-06 VITALS — HEIGHT: 61 IN | BODY MASS INDEX: 30.04 KG/M2

## 2024-08-06 DIAGNOSIS — I25.10 NON-OCCLUSIVE CORONARY ARTERY DISEASE: ICD-10-CM

## 2024-08-06 DIAGNOSIS — I50.32 CHRONIC DIASTOLIC CONGESTIVE HEART FAILURE (HCC): ICD-10-CM

## 2024-08-06 DIAGNOSIS — E66.09 CLASS 1 OBESITY DUE TO EXCESS CALORIES WITH SERIOUS COMORBIDITY AND BODY MASS INDEX (BMI) OF 30.0 TO 30.9 IN ADULT: ICD-10-CM

## 2024-08-06 DIAGNOSIS — Z79.4 TYPE 2 DIABETES MELLITUS WITHOUT COMPLICATION, WITH LONG-TERM CURRENT USE OF INSULIN (HCC): ICD-10-CM

## 2024-08-06 DIAGNOSIS — I48.0 PAROXYSMAL A-FIB (HCC): Primary | ICD-10-CM

## 2024-08-06 DIAGNOSIS — E11.9 TYPE 2 DIABETES MELLITUS WITHOUT COMPLICATION, WITH LONG-TERM CURRENT USE OF INSULIN (HCC): ICD-10-CM

## 2024-08-06 DIAGNOSIS — I10 PRIMARY HYPERTENSION: ICD-10-CM

## 2024-08-06 DIAGNOSIS — Z79.01 CURRENT USE OF LONG TERM ANTICOAGULATION: ICD-10-CM

## 2024-08-06 PROCEDURE — 93000 ELECTROCARDIOGRAM COMPLETE: CPT | Performed by: INTERNAL MEDICINE

## 2024-08-06 PROCEDURE — 99205 OFFICE O/P NEW HI 60 MIN: CPT | Performed by: INTERNAL MEDICINE

## 2024-08-06 RX ORDER — PEN NEEDLE, DIABETIC 32GX 5/32"
NEEDLE, DISPOSABLE MISCELLANEOUS
COMMUNITY
Start: 2024-06-15

## 2024-08-06 RX ORDER — VENLAFAXINE HYDROCHLORIDE 75 MG/1
75 CAPSULE, EXTENDED RELEASE ORAL DAILY
COMMUNITY
Start: 2024-07-02

## 2024-08-06 NOTE — TELEPHONE ENCOUNTER
Shanice Spencer MA sent to P [facility] Surgery Coordinator  Replies will be sent to [facility] Ep Clincal  Per Dr. Figueroa --    Schedule afib ablation.    Thanks

## 2024-08-06 NOTE — LETTER
2024               CARDIAC ABLATION INSTRUCTIONS     Sheba Bowers   : 1950  MRN: [id number]  [address] [facility]  [place] [address]    Procedure: AFIB ABLATION / PFA      Procedure Date: 2024    Location: [facility]  Address: [address] [place] [address], [place] [address]        Labs to be done on : CMP /  CBC (FASTING 8 HOURS)    BLOOD THINNER INSTRUCTIONS (Coumadin / Warfarin / Pradaxa / Eliquis / Xarelto):   Eliquis - Do not take the morning of procedure.     Medication Hold:   BASAGLAR - NO HOLD     Metformin - Take your regular dose day/evening before procedure and do not take morning of procedure.      NOVOLOG - MORNING OF YOUR PROCEDURE.     Torsemide - Do not take morning of procedure.     SPIRONOLACTONE: DO NOT take this medication the morning of the procedure.         Arrival Time: The Hospital will contact you the day prior to your procedure, usually between 4PM - 6PM to instruct you on the time and place to report. If you do not hear from a [facility]  by 6PM the evening prior to your procedure, please contact the campus you are scheduled at.     DO NOT drink or eat ANYTHING after midnight the night prior to your procedure. You may have a minimal amount of water with your AM medications.     Arrange for a responsible adult to drive you to and from the hospital.    You should continue to take your morning medications with a sip of water UNLESS ADVISED OTHERWISE.       DO NOT stop taking Plavix or Aspirin unless advised otherwise.     If you are currently taking Fish Oil, Krill oil and/or Vitamin E please DO NOT TAKE FOR A WEEK PRIOR TO PROCEDURE.     If you are diabetic, DO NOT take any of your diabetic pills the morning of your procedure. Oral diabetic medications may include Glucophage, Prandin, Glyburide, Micronase, Avandia, Glucovance, Precose, Glynase, and Starlix.     Bring a list of daily medications, vitamins, minerals, herbals and  nutritional supplements you take. Please include dosages and the times you take them each day.     If you are packing an overnight bag, pack minimal clothing, you will be given hospital sleepwear.   DO NOT bring money, valuables or jewelry. The wedding band is ok.     If you use CPAP machine, bring it to the hospital.      Bring your Photo ID and Insurance cards with you.     Please notify us if you have been admitted to the hospital within 30 days.      FAILURE TO FOLLOW ANY OF THESE INSTRUCTIONS COULD RESULT IN THE CANCELLATION OF YOUR PROCEDURE      Please call [phone] if you do not hear from the  by 6:00PM the night before your procedure.    All patients enter through ENTRANCE B. [employer] Parking is available free of charge or park on Parking Deck B.        Thank you,   Helena Gusman  Surgery Coordinator  [facility]   [address] [place] [address]  [place] [address]  Teams: [phone]

## 2024-08-06 NOTE — LETTER
August 7, 2024     Devin Buck MD  [address]  [facility] [address]    Patient: Sheba Bowers   YOB: 1950   Date of Visit: 8/6/2024       Dear Dr. Buck:    Thank you for referring Sheba Bowers to me for evaluation. Below are my notes for this consultation.    If you have questions, please do not hesitate to call me. I look forward to following your patient along with you.         Sincerely,        Bernabe Figueroa MD        CC: Shebachayito Bowers  CARDIOLOGY SURGERY COORDINATOR    Bernabe Figueroa MD  8/7/2024  6:43 AM  Sign when Signing Visit   Consultation - Electrophysiology-Cardiology (EP)   Sheba Bowers 73 y.o. female MRN: [id number]  Unit/Bed#:  Encounter: [id number]      1. Paroxysmal A-fib (HCC)  Ambulatory referral to Cardiac Electrophysiology    POCT ECG      2. Chronic diastolic congestive heart failure (HCC)        3. Non-occlusive coronary artery disease        4. Type 2 diabetes mellitus without complication, with long-term current use of insulin (HCC)        5. Current use of long term anticoagulation        6. Primary hypertension        7. Class 1 obesity due to excess calories with serious comorbidity and body mass index (BMI) of 30.0 to 30.9 in adult              Consults  Physician Requesting Consult: Devin Buck, *   Reason for Consult / Principal Problem: PAF & discuss antiarrhythmic therapy         Summary of my recommendation for the patient  Patient has a history of intermittent/paroxysmal A-fib for about 2 to 3 years  She does have a recent episode with RVR, that lasted longer -early persistent and needed to be cardioverted -she has been back in A-fib from time to time since then    Risk factors-age 73, BMI 30, sleep apnea, diabetes mellitus, hypertension, Patient does feel the palpitation and go into diastolic heart failure when she is in RVR    Current management  - Anticoagulation-on apixaban   - rate controlled-metoprolol-higher dose limited by  extreme fatigue  - Rhythm control-none at the current time  - propafenone and sotalol avoided due to risk of further bradycardia, flecainide use will necessitate use of beta-blockers, dofetilide will need to be priced, amiodarone is avoided as a fast drug    Patient is in agreement to proceed with ablation  Risks and benefits explained  About 70% chance of success in the first year with ablation  Small but definite risk of bleeding at site of access, heart attack, stroke, bleeding around the heart, esophageal injury, phrenic nerve injury, diaphragmatic palsy, atrial esophageal fistula, pulmonary vein stenosis    Patient is being recommended PFA  This is an approved method by FDA and is associated with lesser risk of pericarditis, phrenic nerve injury and diaphragm palsy, esophageal injury and atrial esophageal fistula    My recommendation for the patient  CT pulmonary vein  TISHA before the procedure  PFA  NavX  Hold Eliquis the morning of the procedure    Please set up the procedure as soon as possible as patient will need to go back to [place] in September    Price dofetilide 500 mcg twice daily and sotalol 160 mg twice daily    Plan for Assert /Loop post ablation for continual monitoring      Clinical conditions  Atrial fibrillation, paroxysmal -episodes since last 2 years   DC cardioversion in [place] on April 2024  Underlying bradycardia from time to time, sick sinus syndrome  Diastolic heart failure  Nonocclusive CAD  Diabetes mellitus on insulin  Obesity post bypass          Assessment & Plan    Atrial fibrillation, paroxysmal/early persistent-episodes since last 2 years   DC cardioversion in [place] on April 2024  Underlying bradycardia from time to time, sick sinus syndrome  Diastolic heart failure    Patient has a history of intermittent/paroxysmal A-fib for about 2 to 3 years  She does have a recent episode with RVR, that lasted longer -early persistent and needed to be cardioverted -she has been back in  A-fib from time to time since then    Risk factors-age 73, BMI 30, sleep apnea, diabetes mellitus, hypertension, Patient does feel the palpitation and go into diastolic heart failure when she is in RVR    Current management  - Anticoagulation-on apixaban   - rate controlled-metoprolol-higher dose limited by extreme fatigue  - Rhythm control-none at the current time  - propafenone and sotalol avoided due to risk of further bradycardia, flecainide use will necessitate use of beta-blockers, dofetilide will need to be priced, amiodarone is avoided as a fast drug    Patient is in agreement to proceed with ablation  Risks and benefits explained  About 70% chance of success in the first year with ablation  Small but definite risk of bleeding at site of access, heart attack, stroke, bleeding around the heart, esophageal injury, phrenic nerve injury, diaphragmatic palsy, atrial esophageal fistula, pulmonary vein stenosis    Patient is being recommended PFA  This is an approved method by FDA and is associated with lesser risk of pericarditis, phrenic nerve injury and diaphragm palsy, esophageal injury and atrial esophageal fistula    My recommendation for the patient  CT pulmonary vein  TISHA before the procedure  PFA  NavX  Hold Eliquis the morning of the procedure    Please set up the procedure as soon as possible as patient will need to go back to [place] in September          Nonocclusive CAD  No angina at this time      Diabetes mellitus on insulin  Hemoglobin A1c is elevated  Needs further aggressive management      Obesity post bypass  BMI is 30        History of Present Illness  HPI: Sheba Bowers is a 73 y.o. year old female has been referred to me by Dr Buck for the evaluation and management of atrial fibrillation      The patient has significant medical illnesses which include  Atrial fibrillation, paroxysmal -episodes since last 2 years   DC cardioversion in [place] on April 2024  Underlying bradycardia from  time to time, sick sinus syndrome  Diastolic heart failure  Nonocclusive CAD  Diabetes mellitus on insulin  Obesity post bypass      trial fibrillation, paroxysmal/early persistent-episodes since last 2 years   DC cardioversion in [place] on April 2024  Underlying bradycardia from time to time, sick sinus syndrome  Diastolic heart failure    Patient has a history of intermittent/paroxysmal A-fib for about 2 to 3 years  She does have a recent episode with RVR, that lasted longer -early persistent and needed to be cardioverted -she has been back in A-fib from time to time since then    Risk factors-age 73, BMI 30, sleep apnea, diabetes mellitus, hypertension, Patient does feel the palpitation and go into diastolic heart failure when she is in RVR    She is not complaining of anginal-like chest pain at this time  There is occasional orthopnea and PND along with leg swelling  She has been admitted to the hospital and diuresed for the same  When she is in RVR she feels the palpitation  There has been no syncope  There is exertional intolerance    She does have intermittent snoring morning fatigue and daytime sleepiness    She does not smoke  She uses occasional alcohol  She does not use any recreational drug        Historical Information  History reviewed. No pertinent past medical history.  History reviewed. No pertinent surgical history.  Social History     Substance and Sexual Activity   Alcohol Use None     Social History     Substance and Sexual Activity   Drug Use Not on file     Social History     Tobacco Use   Smoking Status Not on file   Smokeless Tobacco Not on file     Social History     Socioeconomic History   • Marital status: Unknown     Spouse name: Not on file   • Number of children: Not on file   • Years of education: Not on file   • Highest education level: Not on file   Occupational History   • Not on file   Tobacco Use   • Smoking status: Not on file   • Smokeless tobacco: Not on file   Substance and  Sexual Activity   • Alcohol use: Not on file   • Drug use: Not on file   • Sexual activity: Not on file   Other Topics Concern   • Not on file   Social History Narrative   • Not on file     Social Determinants of Health     Financial Resource Strain: Low Risk  (1/15/2024)    Received from [facility] [place] [facility]    Overall Financial Resource Strain (CARDIA)    • Difficulty of Paying Living Expenses: Not very hard   Food Insecurity: No Food Insecurity (4/4/2024)    Received from [facility] [place] [facility]    Hunger Vital Sign    • Worried About Running Out of Food in the Last Year: Never true    • Ran Out of Food in the Last Year: Never true   Transportation Needs: No Transportation Needs (4/4/2024)    Received from [facility] [place] [facility]    PRAPARE - Transportation    • Lack of Transportation (Medical): No    • Lack of Transportation (Non-Medical): No   Physical Activity: Insufficiently Active (1/15/2024)    Received from [facility] [place] [facility]    Exercise Vital Sign    • Days of Exercise per Week: 3 days    • Minutes of Exercise per Session: 40 min   Stress: No Stress Concern Present (1/15/2024)    Received from [facility] [place] [facility]    [employer] [place] [employer] - Occupational Stress Questionnaire    • Feeling of Stress : Only a little   Social Connections: Moderately Isolated (1/15/2024)    Received from [facility] [place] [facility]    Social Connection and Isolation Panel [NHANES]    • Frequency of Communication with Friends and Family: More than three times a week    • Frequency of Social Gatherings with Friends and Family: Twice a week    • Attends [employer] Services: 1 to 4 times per year    • Active Member of Clubs or Organizations: No    • Attends Club or Organization Meetings: Patient declined    • Marital Status:    Intimate Partner Violence: Not At Risk (4/4/2024)    Received from [facility]    Humiliation, Afraid, Rape, and Kick questionnaire    • Fear of Current or Ex-Partner: No    • Emotionally Abused: No    • Physically Abused: No    • Sexually Abused: No  "  Housing Stability: Not on file     .  Family History:History reviewed. No pertinent family history.      Meds/Allergies     No current facility-administered medications for this visit.        Not in a hospital admission.    Allergies   Allergen Reactions   • Nickel Rash     Skin irritation   • Latex Itching     Hands turn red           Objective  Vitals: Visit Vitals  Ht 5' 1\" (1.549 m)   BMI 30.04 kg/m²   BSA 1.71 m²      There were no vitals filed for this visit.[unfilled]    Invasive Devices       None                     ROS  Review of Systems   All other systems reviewed and are negative.  As described in my history of present illness        PHYSICAL EXAM  Physical Exam  Vitals reviewed.   Constitutional:       General: She is not in acute distress.     Appearance: Normal appearance. She is obese. She is not ill-appearing.   HENT:      Head: Normocephalic and atraumatic.      Right Ear: External ear normal.      Left Ear: External ear normal.      Nose: Nose normal.      Mouth/Throat:      Comments: Posterior pharynx is crowded  Eyes:      General: No scleral icterus.     Extraocular Movements: Extraocular movements intact.      Conjunctiva/sclera: Conjunctivae normal.      Pupils: Pupils are equal, round, and reactive to light.   Cardiovascular:      Rate and Rhythm: Normal rate and regular rhythm.      Pulses: Normal pulses.      Heart sounds: Normal heart sounds. No murmur heard.  Pulmonary:      Effort: Pulmonary effort is normal. No respiratory distress.      Breath sounds: Normal breath sounds. No wheezing.   Abdominal:      General: Bowel sounds are normal. There is no distension.      Tenderness: There is no abdominal tenderness.      Comments: Central obesity present   Musculoskeletal:         General: No swelling, tenderness or deformity.      Cervical back: No rigidity.   Skin:     Coloration: Skin is not jaundiced.      Findings: No bruising.   Neurological:      Mental Status: She is alert and " oriented to person, place, and time. Mental status is at baseline.      Motor: No weakness.   Psychiatric:         Mood and Affect: Mood normal.         Behavior: Behavior normal.         Thought Content: Thought content normal.         Judgment: Judgment normal.               LAB RESULTS:    CBC:  Component  Ref Range & Units 6/28/24  1:38 PM   Hemoglobin  11.5 - 14.5 g/dL 13.4   Hematocrit  35.0 - 43.0 % 39.1   WBC  4.0 - 10.0 thou/cmm 8.2   RBC  3.70 - 4.70 mill/cmm 4.22   Platelet  140 - 350 thou/cmm 282   MPV  7.5 - 11.3 fL 9.1   MCV  80 - 100 fL 93   MCH  26.0 - 34.0 pg 31.8   MCHC  32.0 - 37.0 g/dL 34.4   RDW  12.0 - 16.0 % 14.1   Differential Type AUTO   Absolute Neutrophils  1.8 - 7.8 thou/cmm 5.0   Absolute Lymphocytes  1.0 - 3.0 thou/cmm 2.1   Absolute Monocytes  0.3 - 1.0 thou/cmm 0.8   Absolute Eosinophils  0.0 - 0.5 thou/cmm 0.2   Absolute Basophils  0.0 - 0.1 thou/cmm 0.1   Neutrophils  % 61   Lymphocytes Manual  % 26   Monocytes  % 10   Eosinophils  % 2   Basophils  % 1       CMP:  Potassium   Date Value Ref Range Status   06/28/2024 4.5 3.5 - 5.2 mmol/L Final     Chloride   Date Value Ref Range Status   06/28/2024 105 100 - 109 mmol/L Final     Carbon Dioxide   Date Value Ref Range Status   06/28/2024 25 21 - 31 mmol/L Final     BUN   Date Value Ref Range Status   06/28/2024 21 7 - 25 mg/dL Final     Creatinine   Date Value Ref Range Status   06/28/2024 0.71 0.40 - 1.10 mg/dL Final     Calcium   Date Value Ref Range Status   06/28/2024 9.8 8.5 - 10.1 mg/dL Final     eGFRcr   Date Value Ref Range Status   06/28/2024 88 >59 Final        Magnesium:   Component  Ref Range & Units 4/6/24 12:44 AM   Magnesium  1.6 - 2.3 mg/dL 1.7       A1C:  Component  Ref Range & Units 4/27/21  5:42 AM   Hemoglobin A1c  <5.7 % 8.8 High        TSH:  Component  Ref Range & Units 4/3/24  8:26 PM   TSH, Ultrasensitive  0.465 - 4.827 mIU/L 2.050       PT/INR:  Component  Ref Range & Units 4/3/24  8:26 PM   Prothrombin Time  "(PT)  11.9 - 14.6 sec 17.3 High    INR 1.38       Lipid Panel:  No results found for: \"CHOLESTEROL\", \"TRIG\", \"HDL\", \"LDLCAL\", \"NONHDLC\"    Troponin:  No results found for: \"TROPONINI\"      Imaging:    NM SPECT 10/5/23  Perfusion Comments   A 1-day rest/stress gated SPECT myocardial perfusion imaging protocol was performed. Overall image quality is good. The stress/rest perfusion TID ratio is 1.12. Myocardial perfusion imaging is normal. There is no evidence of inducible ischemia. There is a prior study available for comparison. As compared to the previous study, there are no changes.     Perfusion Conclusion   This is a low risk nuclear stress test.     Stress Function Comments   Left ventricular wall motion post-stress was normal. Post-stress left ventricular ejection fraction is 64%. LV Systolic function is normal .          TISHA 4/5/24 ([facility], [place])  •  The left ventricular EF by visual approximation is 50-55%. Normal left   ventricular systolic function.   •  Right ventricular systolic function is normal.   •  There is trace aortic valve regurgitation.   •  There is mild to moderate mitral valve regurgitation.   •  There is mild tricuspid valve regurgitation.   •  There is no thrombus in the left atrial cavity or appendage.          Echo Limited 4/4/24 ([facility], [place])  •  The left ventricular EF by visual approximation is 50-55%. Preserved   left ventricular systolic function.   •  Right ventricular systolic function is normal.   •  There is mild aortic valve regurgitation.   •  There is mild mitral valve regurgitation.       Echo 10/5/23 ([facility], [place])  •  Left ventricle cavity appears normal.   •  Left ventricular wall thickness is normal.   •  The left ventricular EF by visual approximation is 55-60%. Normal left   ventricular systolic function.   •  There is grade III left ventricular diastolic dysfunction.   •  Left ventricular wall motion is within normal limits.   •  There is trace aortic valve " regurgitation with centrally directed jet.   •  There is mild mitral valve regurgitation with a centrally directed jet.   •  There is trace tricuspid valve regurgitation.   •  Unable to assess RVSP due to lack of significant tricuspid   regurgitation.   • There is trace pulmonic valve regurgitation.   •  There is a trivial pericardial effusion.   •  There is no echocardiographic evidence of tamponade.   •  No previous echocardiogram available to compare.             EKG reviewed personally:   EKG 2024--sinus bradycardia, 58 bpm, normal axis, low voltage, nonspecific T wave abnormality.  Abnormal study.       [id number]        TISHA:  No results found for this or any previous visit.      Echocardiogram:  No results found for this or any previous visit.      Stress Test:   No results found for this or any previous visit.      Cardiac Catheterization:  No results found for this or any previous visit.      HOLTER MONITOR: 24 HOUR/48 HOUR MONITORS  No results found for this or any previous visit.      AMB Extended Holter Monitor: Zio XT/AT or BioTel  No results found for this or any previous visit.        DEVICE CHECK:     No results found for this or any previous visit.         Code Status: [unfilled]  Advance Directive and Living Will:      Power of :    POLST:      Counseling / Coordination of Care    Detailed discussion done with regards to atrial fibrillation ablation    Bernabe Figueroa MD

## 2024-08-06 NOTE — TELEPHONE ENCOUNTER
Patient scheduled for TISHA/AFIB/PFA  Ablation on 8/30/24 at [facility] with Dr. GARCIA.      Patient aware of all general instructions.    Medication holds:   Basaglar - no hold    Metformin - Take your regular dose day/evening before procedure and do not take morning of procedure.      SPIRONOLACTONE: DO NOT take this medication the morning of the procedure.     TORSEMIDE: DO NOT take this medication the morning of the procedure.     Eliquis - Do not take the morning of procedure.     NOVOLOG: take your FULL regular dose the day before procedure, DO NOT apply any amount in the morning of the procedure.         Labs to be done no later than 8/22/24  CMP / CBC (FASTING 8 HOURS)    8/14/24 is the vein mapping @ [facility]       Thank you,  Helena Gusman

## 2024-08-07 PROBLEM — I10 PRIMARY HYPERTENSION: Status: ACTIVE | Noted: 2024-08-07

## 2024-08-07 PROBLEM — E66.09 CLASS 1 OBESITY DUE TO EXCESS CALORIES WITH SERIOUS COMORBIDITY AND BODY MASS INDEX (BMI) OF 30.0 TO 30.9 IN ADULT: Status: ACTIVE | Noted: 2024-08-07

## 2024-08-07 PROBLEM — Z79.01 CURRENT USE OF LONG TERM ANTICOAGULATION: Status: ACTIVE | Noted: 2024-08-07

## 2024-08-07 PROBLEM — E66.811 CLASS 1 OBESITY DUE TO EXCESS CALORIES WITH SERIOUS COMORBIDITY AND BODY MASS INDEX (BMI) OF 30.0 TO 30.9 IN ADULT: Status: ACTIVE | Noted: 2024-08-07

## 2024-08-08 ENCOUNTER — APPOINTMENT (OUTPATIENT)
Dept: LAB | Facility: HOSPITAL | Age: 74
End: 2024-08-08
Payer: MEDICARE

## 2024-08-08 DIAGNOSIS — I48.0 PAROXYSMAL A-FIB (HCC): ICD-10-CM

## 2024-08-08 DIAGNOSIS — I50.32 CHRONIC DIASTOLIC CONGESTIVE HEART FAILURE (HCC): ICD-10-CM

## 2024-08-08 LAB
ALBUMIN SERPL BCG-MCNC: 4.1 G/DL (ref 3.5–5)
ALP SERPL-CCNC: 39 U/L (ref 34–104)
ALT SERPL W P-5'-P-CCNC: 10 U/L (ref 7–52)
ANION GAP SERPL CALCULATED.3IONS-SCNC: 8 MMOL/L (ref 4–13)
AST SERPL W P-5'-P-CCNC: 21 U/L (ref 13–39)
BASOPHILS # BLD AUTO: 0.08 THOUSANDS/ÂΜL (ref 0–0.1)
BASOPHILS NFR BLD AUTO: 1 % (ref 0–1)
BILIRUB SERPL-MCNC: 0.52 MG/DL (ref 0.2–1)
BUN SERPL-MCNC: 13 MG/DL (ref 5–25)
CALCIUM SERPL-MCNC: 9.4 MG/DL (ref 8.4–10.2)
CHLORIDE SERPL-SCNC: 106 MMOL/L (ref 96–108)
CO2 SERPL-SCNC: 26 MMOL/L (ref 21–32)
CREAT SERPL-MCNC: 0.83 MG/DL (ref 0.6–1.3)
EOSINOPHIL # BLD AUTO: 0.17 THOUSAND/ÂΜL (ref 0–0.61)
EOSINOPHIL NFR BLD AUTO: 2 % (ref 0–6)
ERYTHROCYTE [DISTWIDTH] IN BLOOD BY AUTOMATED COUNT: 12.8 % (ref 11.6–15.1)
GFR SERPL CREATININE-BSD FRML MDRD: 69 ML/MIN/1.73SQ M
GLUCOSE P FAST SERPL-MCNC: 85 MG/DL (ref 65–99)
HCT VFR BLD AUTO: 37.5 % (ref 34.8–46.1)
HGB BLD-MCNC: 12.6 G/DL (ref 11.5–15.4)
IMM GRANULOCYTES # BLD AUTO: 0.03 THOUSAND/UL (ref 0–0.2)
IMM GRANULOCYTES NFR BLD AUTO: 0 % (ref 0–2)
LYMPHOCYTES # BLD AUTO: 2.26 THOUSANDS/ÂΜL (ref 0.6–4.47)
LYMPHOCYTES NFR BLD AUTO: 25 % (ref 14–44)
MCH RBC QN AUTO: 31.5 PG (ref 26.8–34.3)
MCHC RBC AUTO-ENTMCNC: 33.6 G/DL (ref 31.4–37.4)
MCV RBC AUTO: 94 FL (ref 82–98)
MONOCYTES # BLD AUTO: 0.82 THOUSAND/ÂΜL (ref 0.17–1.22)
MONOCYTES NFR BLD AUTO: 9 % (ref 4–12)
NEUTROPHILS # BLD AUTO: 5.68 THOUSANDS/ÂΜL (ref 1.85–7.62)
NEUTS SEG NFR BLD AUTO: 63 % (ref 43–75)
NRBC BLD AUTO-RTO: 0 /100 WBCS
PLATELET # BLD AUTO: 260 THOUSANDS/UL (ref 149–390)
PMV BLD AUTO: 10.2 FL (ref 8.9–12.7)
POTASSIUM SERPL-SCNC: 4 MMOL/L (ref 3.5–5.3)
PROT SERPL-MCNC: 6.7 G/DL (ref 6.4–8.4)
RBC # BLD AUTO: 4 MILLION/UL (ref 3.81–5.12)
SODIUM SERPL-SCNC: 140 MMOL/L (ref 135–147)
WBC # BLD AUTO: 9.04 THOUSAND/UL (ref 4.31–10.16)

## 2024-08-08 PROCEDURE — 36415 COLL VENOUS BLD VENIPUNCTURE: CPT

## 2024-08-08 PROCEDURE — 85025 COMPLETE CBC W/AUTO DIFF WBC: CPT

## 2024-08-08 PROCEDURE — 80053 COMPREHEN METABOLIC PANEL: CPT

## 2024-08-14 ENCOUNTER — HOSPITAL ENCOUNTER (OUTPATIENT)
Dept: CT IMAGING | Facility: HOSPITAL | Age: 74
Discharge: HOME | End: 2024-08-14
Attending: INTERNAL MEDICINE
Payer: MEDICARE

## 2024-08-14 DIAGNOSIS — I48.0 PAROXYSMAL A-FIB (HCC): ICD-10-CM

## 2024-08-14 DIAGNOSIS — I50.32 CHRONIC DIASTOLIC CONGESTIVE HEART FAILURE (HCC): ICD-10-CM

## 2024-08-14 PROCEDURE — 75572 CT HRT W/3D IMAGE: CPT

## 2024-08-14 RX ADMIN — IOHEXOL 100 ML: 350 INJECTION, SOLUTION INTRAVENOUS at 16:27

## 2024-08-15 ENCOUNTER — PREP FOR PROCEDURE (OUTPATIENT)
Dept: CARDIOLOGY CLINIC | Facility: CLINIC | Age: 74
End: 2024-08-15

## 2024-08-15 DIAGNOSIS — I50.32 CHRONIC DIASTOLIC CONGESTIVE HEART FAILURE (HCC): ICD-10-CM

## 2024-08-15 DIAGNOSIS — I48.0 PAROXYSMAL A-FIB (HCC): Primary | ICD-10-CM

## 2024-08-26 ENCOUNTER — TELEPHONE (OUTPATIENT)
Dept: CARDIOLOGY CLINIC | Facility: CLINIC | Age: 74
End: 2024-08-26

## 2024-08-26 NOTE — TELEPHONE ENCOUNTER
----- Message from Terri POLK sent at 8/26/2024 12:10 PM EDT -----  Regarding: RE: DOFETILIDE  Helena,     Please see below and get med pricing and patient also needs loop implant per 's consult notes.  ----- Message -----  From: Bernabe Figueroa MD  Sent: 8/26/2024  11:59 AM EDT  To: Shanice Spencer MA; Marli Brown PA-C; #  Subject: RE: DOFETILIDE                                   Mild plan was ablation to be followed by initiation of medication  ----- Message -----  From: Marli Brown PA-C  Sent: 8/26/2024  11:49 AM EDT  To: Shanice Spencer MA; Bernabe Figueroa MD; #  Subject: DOFETILIDE                                       Hello all,    Just doing orders for the rest of the weekend I did see that this patient had been recommended an ablation along with antiarrhythmic initiation.  Just wanted to know if this part has been done or if plans are changed from medication initiation afterwards?    Thanks in advance,  Marli

## 2024-08-26 NOTE — TELEPHONE ENCOUNTER
Spoke to pt. Reviewed and answered all questions regarding admission. She wasn't aware it would be a 3 day stay and needs to find someone to watch her dog. Also sent additional info to her via [employer] regarding 2 antiarrythmic drugs, Sotalol and Dofetilide.

## 2024-08-30 ENCOUNTER — HOSPITAL ENCOUNTER (INPATIENT)
Facility: HOSPITAL | Age: 74
LOS: 2 days | Discharge: HOME | DRG: 273 | End: 2024-09-02
Attending: INTERNAL MEDICINE | Admitting: INTERNAL MEDICINE
Payer: MEDICARE

## 2024-08-30 ENCOUNTER — APPOINTMENT (OUTPATIENT)
Dept: NON INVASIVE DIAGNOSTICS | Facility: HOSPITAL | Age: 74
DRG: 273 | End: 2024-08-30
Attending: INTERNAL MEDICINE
Payer: MEDICARE

## 2024-08-30 ENCOUNTER — ANESTHESIA EVENT (OUTPATIENT)
Dept: NON INVASIVE DIAGNOSTICS | Facility: HOSPITAL | Age: 74
DRG: 273 | End: 2024-08-30
Payer: MEDICARE

## 2024-08-30 DIAGNOSIS — I10 PRIMARY HYPERTENSION: Primary | ICD-10-CM

## 2024-08-30 DIAGNOSIS — I48.0 PAROXYSMAL A-FIB (HCC): ICD-10-CM

## 2024-08-30 DIAGNOSIS — I50.32 CHRONIC DIASTOLIC CONGESTIVE HEART FAILURE (HCC): ICD-10-CM

## 2024-08-30 PROBLEM — Z98.890 STATUS POST ABLATION OF ATRIAL FIBRILLATION: Status: ACTIVE | Noted: 2024-08-30

## 2024-08-30 PROBLEM — Z86.79 STATUS POST ABLATION OF ATRIAL FIBRILLATION: Status: ACTIVE | Noted: 2024-08-30

## 2024-08-30 PROBLEM — Z95.818 STATUS POST PLACEMENT OF IMPLANTABLE LOOP RECORDER: Status: ACTIVE | Noted: 2024-08-30

## 2024-08-30 LAB
ANION GAP SERPL CALCULATED.3IONS-SCNC: 9 MMOL/L (ref 4–13)
ATRIAL RATE: 312 BPM
ATRIAL RATE: 73 BPM
BASOPHILS # BLD AUTO: 0.09 THOUSANDS/ÂΜL (ref 0–0.1)
BASOPHILS NFR BLD AUTO: 1 % (ref 0–1)
BUN SERPL-MCNC: 19 MG/DL (ref 5–25)
CALCIUM SERPL-MCNC: 9 MG/DL (ref 8.4–10.2)
CHLORIDE SERPL-SCNC: 105 MMOL/L (ref 96–108)
CO2 SERPL-SCNC: 26 MMOL/L (ref 21–32)
CREAT SERPL-MCNC: 0.71 MG/DL (ref 0.6–1.3)
EOSINOPHIL # BLD AUTO: 0.22 THOUSAND/ÂΜL (ref 0–0.61)
EOSINOPHIL NFR BLD AUTO: 3 % (ref 0–6)
ERYTHROCYTE [DISTWIDTH] IN BLOOD BY AUTOMATED COUNT: 12.3 % (ref 11.6–15.1)
GFR SERPL CREATININE-BSD FRML MDRD: 83 ML/MIN/1.73SQ M
GLUCOSE P FAST SERPL-MCNC: 138 MG/DL (ref 65–99)
GLUCOSE SERPL-MCNC: 136 MG/DL (ref 65–140)
GLUCOSE SERPL-MCNC: 138 MG/DL (ref 65–140)
GLUCOSE SERPL-MCNC: 214 MG/DL (ref 65–140)
GLUCOSE SERPL-MCNC: 347 MG/DL (ref 65–140)
HCT VFR BLD AUTO: 39.7 % (ref 34.8–46.1)
HGB BLD-MCNC: 13.9 G/DL (ref 11.5–15.4)
IMM GRANULOCYTES # BLD AUTO: 0.02 THOUSAND/UL (ref 0–0.2)
IMM GRANULOCYTES NFR BLD AUTO: 0 % (ref 0–2)
INR PPP: 1.2 (ref 0.85–1.19)
KCT BLD-ACNC: 314 SEC (ref 89–137)
KCT BLD-ACNC: 347 SEC (ref 89–137)
KCT BLD-ACNC: 354 SEC (ref 89–137)
KCT BLD-ACNC: 361 SEC (ref 89–137)
KCT BLD-ACNC: 381 SEC (ref 89–137)
LYMPHOCYTES # BLD AUTO: 2.6 THOUSANDS/ÂΜL (ref 0.6–4.47)
LYMPHOCYTES NFR BLD AUTO: 32 % (ref 14–44)
MCH RBC QN AUTO: 32.6 PG (ref 26.8–34.3)
MCHC RBC AUTO-ENTMCNC: 35 G/DL (ref 31.4–37.4)
MCV RBC AUTO: 93 FL (ref 82–98)
MONOCYTES # BLD AUTO: 0.88 THOUSAND/ÂΜL (ref 0.17–1.22)
MONOCYTES NFR BLD AUTO: 11 % (ref 4–12)
NEUTROPHILS # BLD AUTO: 4.3 THOUSANDS/ÂΜL (ref 1.85–7.62)
NEUTS SEG NFR BLD AUTO: 53 % (ref 43–75)
NRBC BLD AUTO-RTO: 0 /100 WBCS
P AXIS: 88 DEGREES
PLATELET # BLD AUTO: 264 THOUSANDS/UL (ref 149–390)
PMV BLD AUTO: 9.9 FL (ref 8.9–12.7)
POTASSIUM SERPL-SCNC: 4.2 MMOL/L (ref 3.5–5.3)
PR INTERVAL: 166 MS
PROTHROMBIN TIME: 15.5 SECONDS (ref 12.3–15)
QRS AXIS: 25 DEGREES
QRS AXIS: 57 DEGREES
QRSD INTERVAL: 78 MS
QRSD INTERVAL: 80 MS
QT INTERVAL: 286 MS
QT INTERVAL: 420 MS
QTC INTERVAL: 390 MS
QTC INTERVAL: 462 MS
RBC # BLD AUTO: 4.26 MILLION/UL (ref 3.81–5.12)
SL CV ECHO AV MASS SIZE 1: 0.6 CM
SL CV LV EF: 50
SODIUM SERPL-SCNC: 140 MMOL/L (ref 135–147)
SPECIMEN SOURCE: ABNORMAL
T WAVE AXIS: 131 DEGREES
T WAVE AXIS: 185 DEGREES
VENTRICULAR RATE: 112 BPM
VENTRICULAR RATE: 73 BPM
WBC # BLD AUTO: 8.11 THOUSAND/UL (ref 4.31–10.16)

## 2024-08-30 PROCEDURE — 93657 TX L/R ATRIAL FIB ADDL: CPT | Performed by: INTERNAL MEDICINE

## 2024-08-30 PROCEDURE — 93655 ICAR CATH ABLTJ DSCRT ARRHYT: CPT | Performed by: INTERNAL MEDICINE

## 2024-08-30 PROCEDURE — 80048 BASIC METABOLIC PNL TOTAL CA: CPT | Performed by: PHYSICIAN ASSISTANT

## 2024-08-30 PROCEDURE — 93325 DOPPLER ECHO COLOR FLOW MAPG: CPT | Performed by: INTERNAL MEDICINE

## 2024-08-30 PROCEDURE — C1733 CATH, EP, OTHR THAN COOL-TIP: HCPCS | Performed by: INTERNAL MEDICINE

## 2024-08-30 PROCEDURE — 93312 ECHO TRANSESOPHAGEAL: CPT | Performed by: INTERNAL MEDICINE

## 2024-08-30 PROCEDURE — 85025 COMPLETE CBC W/AUTO DIFF WBC: CPT | Performed by: PHYSICIAN ASSISTANT

## 2024-08-30 PROCEDURE — C1766 INTRO/SHEATH,STRBLE,NON-PEEL: HCPCS | Performed by: INTERNAL MEDICINE

## 2024-08-30 PROCEDURE — 76376 3D RENDER W/INTRP POSTPROCES: CPT

## 2024-08-30 PROCEDURE — C1769 GUIDE WIRE: HCPCS | Performed by: INTERNAL MEDICINE

## 2024-08-30 PROCEDURE — C1894 INTRO/SHEATH, NON-LASER: HCPCS | Performed by: INTERNAL MEDICINE

## 2024-08-30 PROCEDURE — C1764 EVENT RECORDER, CARDIAC: HCPCS | Performed by: INTERNAL MEDICINE

## 2024-08-30 PROCEDURE — 0JH602Z INSERTION OF MONITORING DEVICE INTO CHEST SUBCUTANEOUS TISSUE AND FASCIA, OPEN APPROACH: ICD-10-PCS | Performed by: INTERNAL MEDICINE

## 2024-08-30 PROCEDURE — 76937 US GUIDE VASCULAR ACCESS: CPT | Performed by: INTERNAL MEDICINE

## 2024-08-30 PROCEDURE — C1731 CATH, EP, 20 OR MORE ELEC: HCPCS | Performed by: INTERNAL MEDICINE

## 2024-08-30 PROCEDURE — C1730 CATH, EP, 19 OR FEW ELECT: HCPCS | Performed by: INTERNAL MEDICINE

## 2024-08-30 PROCEDURE — 33285 INSJ SUBQ CAR RHYTHM MNTR: CPT | Performed by: INTERNAL MEDICINE

## 2024-08-30 PROCEDURE — 02583ZZ DESTRUCTION OF CONDUCTION MECHANISM, PERCUTANEOUS APPROACH: ICD-10-PCS | Performed by: INTERNAL MEDICINE

## 2024-08-30 PROCEDURE — 93320 DOPPLER ECHO COMPLETE: CPT | Performed by: INTERNAL MEDICINE

## 2024-08-30 PROCEDURE — 82948 REAGENT STRIP/BLOOD GLUCOSE: CPT

## 2024-08-30 PROCEDURE — 93010 ELECTROCARDIOGRAM REPORT: CPT | Performed by: INTERNAL MEDICINE

## 2024-08-30 PROCEDURE — C1759 CATH, INTRA ECHOCARDIOGRAPHY: HCPCS | Performed by: INTERNAL MEDICINE

## 2024-08-30 PROCEDURE — 85347 COAGULATION TIME ACTIVATED: CPT

## 2024-08-30 PROCEDURE — 85610 PROTHROMBIN TIME: CPT | Performed by: PHYSICIAN ASSISTANT

## 2024-08-30 PROCEDURE — 93312 ECHO TRANSESOPHAGEAL: CPT

## 2024-08-30 PROCEDURE — NC001 PR NO CHARGE: Performed by: PHYSICIAN ASSISTANT

## 2024-08-30 PROCEDURE — 76376 3D RENDER W/INTRP POSTPROCES: CPT | Performed by: INTERNAL MEDICINE

## 2024-08-30 PROCEDURE — 93656 COMPRE EP EVAL ABLTJ ATR FIB: CPT | Performed by: INTERNAL MEDICINE

## 2024-08-30 PROCEDURE — 02K83ZZ MAP CONDUCTION MECHANISM, PERCUTANEOUS APPROACH: ICD-10-PCS | Performed by: INTERNAL MEDICINE

## 2024-08-30 PROCEDURE — 93005 ELECTROCARDIOGRAM TRACING: CPT

## 2024-08-30 DEVICE — ICM LNQ22 LINQ II USA
Type: IMPLANTABLE DEVICE | Site: CHEST  WALL | Status: FUNCTIONAL
Brand: LINQ II™

## 2024-08-30 RX ORDER — FENTANYL CITRATE/PF 50 MCG/ML
25 SYRINGE (ML) INJECTION
Status: DISCONTINUED | OUTPATIENT
Start: 2024-08-30 | End: 2024-08-30 | Stop reason: HOSPADM

## 2024-08-30 RX ORDER — HEPARIN SODIUM 1000 [USP'U]/ML
INJECTION, SOLUTION INTRAVENOUS; SUBCUTANEOUS CODE/TRAUMA/SEDATION MEDICATION
Status: DISCONTINUED | OUTPATIENT
Start: 2024-08-30 | End: 2024-08-30 | Stop reason: HOSPADM

## 2024-08-30 RX ORDER — FENTANYL CITRATE 50 UG/ML
INJECTION, SOLUTION INTRAMUSCULAR; INTRAVENOUS AS NEEDED
Status: DISCONTINUED | OUTPATIENT
Start: 2024-08-30 | End: 2024-08-30

## 2024-08-30 RX ORDER — ALBUTEROL SULFATE 0.83 MG/ML
2.5 SOLUTION RESPIRATORY (INHALATION) ONCE AS NEEDED
Status: DISCONTINUED | OUTPATIENT
Start: 2024-08-30 | End: 2024-08-30 | Stop reason: HOSPADM

## 2024-08-30 RX ORDER — NITROGLYCERIN 20 MG/100ML
INJECTION INTRAVENOUS CODE/TRAUMA/SEDATION MEDICATION
Status: DISCONTINUED | OUTPATIENT
Start: 2024-08-30 | End: 2024-08-30 | Stop reason: HOSPADM

## 2024-08-30 RX ORDER — INSULIN LISPRO 100 [IU]/ML
1-5 INJECTION, SOLUTION INTRAVENOUS; SUBCUTANEOUS
Status: DISCONTINUED | OUTPATIENT
Start: 2024-08-30 | End: 2024-09-02 | Stop reason: HOSPADM

## 2024-08-30 RX ORDER — PROPOFOL 10 MG/ML
INJECTION, EMULSION INTRAVENOUS AS NEEDED
Status: DISCONTINUED | OUTPATIENT
Start: 2024-08-30 | End: 2024-08-30

## 2024-08-30 RX ORDER — ACETAMINOPHEN 325 MG/1
650 TABLET ORAL EVERY 4 HOURS PRN
Status: DISCONTINUED | OUTPATIENT
Start: 2024-08-30 | End: 2024-09-01

## 2024-08-30 RX ORDER — PROTAMINE SULFATE 10 MG/ML
INJECTION, SOLUTION INTRAVENOUS AS NEEDED
Status: DISCONTINUED | OUTPATIENT
Start: 2024-08-30 | End: 2024-08-30

## 2024-08-30 RX ORDER — VENLAFAXINE HYDROCHLORIDE 75 MG/1
75 CAPSULE, EXTENDED RELEASE ORAL DAILY
Status: DISCONTINUED | OUTPATIENT
Start: 2024-08-31 | End: 2024-09-02 | Stop reason: HOSPADM

## 2024-08-30 RX ORDER — INSULIN LISPRO 100 [IU]/ML
5 INJECTION, SOLUTION INTRAVENOUS; SUBCUTANEOUS
Status: DISCONTINUED | OUTPATIENT
Start: 2024-08-30 | End: 2024-09-02 | Stop reason: HOSPADM

## 2024-08-30 RX ORDER — METOCLOPRAMIDE HYDROCHLORIDE 5 MG/ML
10 INJECTION INTRAMUSCULAR; INTRAVENOUS ONCE
Status: COMPLETED | OUTPATIENT
Start: 2024-08-30 | End: 2024-08-30

## 2024-08-30 RX ORDER — TORSEMIDE 5 MG/1
5 TABLET ORAL DAILY
Status: DISCONTINUED | OUTPATIENT
Start: 2024-08-31 | End: 2024-09-02 | Stop reason: HOSPADM

## 2024-08-30 RX ORDER — SPIRONOLACTONE 25 MG/1
25 TABLET ORAL DAILY
Status: DISCONTINUED | OUTPATIENT
Start: 2024-08-31 | End: 2024-09-02 | Stop reason: HOSPADM

## 2024-08-30 RX ORDER — HEPARIN SODIUM 10000 [USP'U]/100ML
INJECTION, SOLUTION INTRAVENOUS
Status: DISCONTINUED | OUTPATIENT
Start: 2024-08-30 | End: 2024-08-30 | Stop reason: HOSPADM

## 2024-08-30 RX ORDER — SODIUM CHLORIDE 9 MG/ML
20 INJECTION, SOLUTION INTRAVENOUS ONCE
Status: COMPLETED | OUTPATIENT
Start: 2024-08-30 | End: 2024-08-30

## 2024-08-30 RX ORDER — DEXAMETHASONE SODIUM PHOSPHATE 10 MG/ML
INJECTION, SOLUTION INTRAMUSCULAR; INTRAVENOUS AS NEEDED
Status: DISCONTINUED | OUTPATIENT
Start: 2024-08-30 | End: 2024-08-30

## 2024-08-30 RX ORDER — ONDANSETRON 2 MG/ML
INJECTION INTRAMUSCULAR; INTRAVENOUS AS NEEDED
Status: DISCONTINUED | OUTPATIENT
Start: 2024-08-30 | End: 2024-08-30

## 2024-08-30 RX ORDER — ONDANSETRON 2 MG/ML
4 INJECTION INTRAMUSCULAR; INTRAVENOUS ONCE AS NEEDED
Status: COMPLETED | OUTPATIENT
Start: 2024-08-30 | End: 2024-08-30

## 2024-08-30 RX ORDER — DIPHENHYDRAMINE HYDROCHLORIDE 50 MG/ML
12.5 INJECTION INTRAMUSCULAR; INTRAVENOUS ONCE AS NEEDED
Status: COMPLETED | OUTPATIENT
Start: 2024-08-30 | End: 2024-08-30

## 2024-08-30 RX ORDER — LIDOCAINE HYDROCHLORIDE 10 MG/ML
INJECTION, SOLUTION EPIDURAL; INFILTRATION; INTRACAUDAL; PERINEURAL AS NEEDED
Status: DISCONTINUED | OUTPATIENT
Start: 2024-08-30 | End: 2024-08-30

## 2024-08-30 RX ORDER — LIDOCAINE HYDROCHLORIDE 10 MG/ML
INJECTION, SOLUTION EPIDURAL; INFILTRATION; INTRACAUDAL; PERINEURAL CODE/TRAUMA/SEDATION MEDICATION
Status: DISCONTINUED | OUTPATIENT
Start: 2024-08-30 | End: 2024-08-30 | Stop reason: HOSPADM

## 2024-08-30 RX ORDER — ROCURONIUM BROMIDE 10 MG/ML
INJECTION, SOLUTION INTRAVENOUS AS NEEDED
Status: DISCONTINUED | OUTPATIENT
Start: 2024-08-30 | End: 2024-08-30

## 2024-08-30 RX ORDER — SODIUM CHLORIDE 9 MG/ML
INJECTION, SOLUTION INTRAVENOUS CONTINUOUS PRN
Status: DISCONTINUED | OUTPATIENT
Start: 2024-08-30 | End: 2024-08-30

## 2024-08-30 RX ORDER — CHOLESTYRAMINE LIGHT 4 G/5.7G
4 POWDER, FOR SUSPENSION ORAL EVERY OTHER DAY
Status: DISCONTINUED | OUTPATIENT
Start: 2024-08-31 | End: 2024-09-02 | Stop reason: HOSPADM

## 2024-08-30 RX ORDER — GLYCOPYRROLATE 0.2 MG/ML
INJECTION INTRAMUSCULAR; INTRAVENOUS AS NEEDED
Status: DISCONTINUED | OUTPATIENT
Start: 2024-08-30 | End: 2024-08-30

## 2024-08-30 RX ORDER — PHENYLEPHRINE HCL IN 0.9% NACL 1 MG/10 ML
SYRINGE (ML) INTRAVENOUS AS NEEDED
Status: DISCONTINUED | OUTPATIENT
Start: 2024-08-30 | End: 2024-08-30

## 2024-08-30 RX ORDER — CEFAZOLIN SODIUM 1 G/50ML
1000 SOLUTION INTRAVENOUS ONCE
Status: COMPLETED | OUTPATIENT
Start: 2024-08-30 | End: 2024-08-30

## 2024-08-30 RX ORDER — SOTALOL HYDROCHLORIDE 80 MG/1
80 TABLET ORAL EVERY 12 HOURS SCHEDULED
Status: DISCONTINUED | OUTPATIENT
Start: 2024-08-30 | End: 2024-09-02 | Stop reason: HOSPADM

## 2024-08-30 RX ADMIN — SODIUM CHLORIDE: 0.9 INJECTION, SOLUTION INTRAVENOUS at 10:57

## 2024-08-30 RX ADMIN — SUGAMMADEX 200 MG: 100 INJECTION, SOLUTION INTRAVENOUS at 14:29

## 2024-08-30 RX ADMIN — NOREPINEPHRINE BITARTRATE 8 MCG: 1 SOLUTION INTRAVENOUS at 12:39

## 2024-08-30 RX ADMIN — DEXAMETHASONE SODIUM PHOSPHATE 10 MG: 10 INJECTION, SOLUTION INTRAMUSCULAR; INTRAVENOUS at 11:35

## 2024-08-30 RX ADMIN — DIPHENHYDRAMINE HYDROCHLORIDE 12.5 MG: 50 INJECTION, SOLUTION INTRAMUSCULAR; INTRAVENOUS at 15:14

## 2024-08-30 RX ADMIN — INSULIN LISPRO 5 UNITS: 100 INJECTION, SOLUTION INTRAVENOUS; SUBCUTANEOUS at 19:19

## 2024-08-30 RX ADMIN — METOCLOPRAMIDE 10 MG: 5 INJECTION, SOLUTION INTRAMUSCULAR; INTRAVENOUS at 16:06

## 2024-08-30 RX ADMIN — ROCURONIUM 30 MG: 50 INJECTION, SOLUTION INTRAVENOUS at 12:26

## 2024-08-30 RX ADMIN — Medication 200 MCG: at 12:40

## 2024-08-30 RX ADMIN — SOTALOL HYDROCHLORIDE 80 MG: 80 TABLET ORAL at 20:18

## 2024-08-30 RX ADMIN — INSULIN LISPRO 3 UNITS: 100 INJECTION, SOLUTION INTRAVENOUS; SUBCUTANEOUS at 22:56

## 2024-08-30 RX ADMIN — ROCURONIUM 50 MG: 50 INJECTION, SOLUTION INTRAVENOUS at 11:26

## 2024-08-30 RX ADMIN — Medication 200 MCG: at 11:31

## 2024-08-30 RX ADMIN — ROCURONIUM 20 MG: 50 INJECTION, SOLUTION INTRAVENOUS at 12:59

## 2024-08-30 RX ADMIN — ONDANSETRON 4 MG: 2 INJECTION INTRAMUSCULAR; INTRAVENOUS at 14:05

## 2024-08-30 RX ADMIN — LIDOCAINE HYDROCHLORIDE 50 MG: 10 INJECTION, SOLUTION EPIDURAL; INFILTRATION; INTRACAUDAL; PERINEURAL at 11:26

## 2024-08-30 RX ADMIN — NOREPINEPHRINE BITARTRATE 16 MCG: 1 SOLUTION INTRAVENOUS at 13:46

## 2024-08-30 RX ADMIN — PROTAMINE SULFATE 80 MG: 10 INJECTION, SOLUTION INTRAVENOUS at 14:10

## 2024-08-30 RX ADMIN — PHENYLEPHRINE HYDROCHLORIDE 30 MCG/MIN: 10 INJECTION INTRAVENOUS at 11:26

## 2024-08-30 RX ADMIN — GLYCOPYRROLATE 0.3 MG: 0.2 INJECTION, SOLUTION INTRAMUSCULAR; INTRAVENOUS at 12:23

## 2024-08-30 RX ADMIN — PROPOFOL 150 MG: 10 INJECTION, EMULSION INTRAVENOUS at 11:26

## 2024-08-30 RX ADMIN — APIXABAN 5 MG: 5 TABLET, FILM COATED ORAL at 18:16

## 2024-08-30 RX ADMIN — SODIUM CHLORIDE 20 ML/HR: 0.9 INJECTION, SOLUTION INTRAVENOUS at 08:51

## 2024-08-30 RX ADMIN — SODIUM CHLORIDE: 0.9 INJECTION, SOLUTION INTRAVENOUS at 12:55

## 2024-08-30 RX ADMIN — Medication 200 MCG: at 14:24

## 2024-08-30 RX ADMIN — Medication 200 MCG: at 12:22

## 2024-08-30 RX ADMIN — CEFAZOLIN SODIUM 1000 MG: 1 SOLUTION INTRAVENOUS at 11:53

## 2024-08-30 RX ADMIN — Medication 200 MCG: at 12:05

## 2024-08-30 RX ADMIN — ONDANSETRON 4 MG: 2 INJECTION INTRAMUSCULAR; INTRAVENOUS at 14:56

## 2024-08-30 RX ADMIN — FENTANYL CITRATE 100 MCG: 50 INJECTION INTRAMUSCULAR; INTRAVENOUS at 11:26

## 2024-08-30 RX ADMIN — Medication 200 MCG: at 12:35

## 2024-08-30 RX ADMIN — Medication 100 MCG: at 13:35

## 2024-08-30 NOTE — ANESTHESIA POSTPROCEDURE EVALUATION
Post-Op Assessment Note    CV Status:  Stable  Pain Score: 0    Pain management: adequate       Mental Status:  Awake and sleepy   Hydration Status:  Euvolemic   PONV Controlled:  Controlled   Airway Patency:  Patent     Post Op Vitals Reviewed: Yes    No anethesia notable event occurred.    Staff: KOSTAS               /53 (08/30/24 1453)    Temp 97.5 °F (36.4 °C) (08/30/24 1453)    Pulse 73 (08/30/24 1453)   Resp 19 (08/30/24 1453)    SpO2 100 % (08/30/24 1453)

## 2024-08-30 NOTE — DISCHARGE INSTR - AVS FIRST PAGE
PLEASE NOTE THE FOLLOWING MEDICATION RECOMMENDATIONS:  - take sotalol 80 mg every 12 hours --- * at your home [facility] pharmacy ([address]), open until 6 PM*  - discontinue metoprolol  - continue Eliquis 5 mg twice daily       Please check blood work (CBC + BMP) in one week.       RESTRICTIONS:   No heavy lifting (more than 5-10 pounds) or strenuous activity for one week.    No soaking in a bath tub/hot tub/swimming pool for one week or until groin heals. You may shower - please let soap and water run over the groins, no scrubbing, and pat the area dry. Please place band-aid on groins daily for up to five days, but you may remove sooner if no issues are noted.     If you notice ongoing bleeding, swelling, or firm lumps in groin near ablation incision, please contact Dr. Figueroa's office - [phone]. If you have any significant issues after hours or on the weekends, please call the on call cardiology number at [phone].    LOOP RECORDER INSTRUCTIONS:  Do not use lotions/powders/creams on incision. Remove outer bandage 48 hours after procedure - if present, leave suture in place and they will be removed at 2 week follow up appointment. Please keep incision dry for the first first week - either keeping incision out of direct shower water or placing over the counter waterproof bandages over top when showering. Please call the office [phone] if you notice redness, swelling, bleeding, or drainage from incision or if you develop fevers.      Cardiac Loop Recorder Insertion      WHAT YOU SHOULD KNOW:    A cardiac loop recorder is a device used to diagnose heart rhythm problems, such as a fast or irregular heartbeat. It is implanted in your left chest, just under the skin. The device records a pattern of your heart's rhythm, called an EKG. Your device records automatic EKGs, depending on how your caregiver programs it. You may also receive a handheld controller. You press a button on the controller when  you have symptoms, such as dizziness, lightheadedness, or palpitations. The device will record an EKG at that moment. The recording can help your caregiver see if your symptoms may be caused by heart rhythm problems. Your caregiver will remove the device after it has collected enough data. You may need the device for up to 3 years. The procedure to remove the device is similar to the procedure used to implant it.      AFTER YOU LEAVE:    Follow up with your cardiologist as directed: You will need to return in 1 to 2 weeks. Your cardiologist will check your incision. He may also program your device settings again. He will retrieve data from the device every 1 to 3 months with a monitor held over your skin. You may be able to transmit data from your device from home as well. You will do this by calling a number provided by your cardiologist, or as they have instructed you. Ask for information about this process. Write down your questions so you remember to ask them during your visits.      Wound care: Keep loop recorder incision dry for one week. Do not use lotions/powders/creams on incision. Remove outer bandage 48 hours after procedure - leave underlying steri-strips in place, they will either fall off on their own or will be removed at 2 week follow up appointment. Please call the office if you notice redness, swelling, bleeding, or drainage from incision or if you develop fevers. After that first week, carefully wash your incision with soap and water. Keep the area clean and dry until it heals.      Return to activity: If you received anesthesia, you will not be able to drive for 24 hours. Otherwise, most people can return to normal activities soon after the procedure. Your cardiologist may want to know if your work involves electrical current or high-voltage equipment. Ask about other electrical items that could interfere with your cardiac loop recorder.      Contact your cardiologist if:   You have a fever or  chills.    Your wound is red, swollen, or draining pus.    You have questions or concerns about your condition or care.    Seek care immediately or call 911 if:   You feel weak, dizzy, or faint.    You lose consciousness.      © 2014 [employer] Information is for End User's use only and may not be sold, redistributed or otherwise used for commercial purposes. All illustrations and images included in CareNotes® are the copyrighted property of [employer], [employer] or [employer].    The above information is an  only. It is not intended as medical advice for individual conditions or treatments. Talk to your doctor, nurse or pharmacist before following any medical regimen to see if it is safe and effective for you.      ANTIARRHYTHMIC MEDICATION FOLLOW UP:  After starting this new medication, you will need to come to one of our cardiology offices for a 1 week EKG appointment. Please see further discharge instructions for this appointment time and location. If you have any issues with the timing, please call our schedulers at [phone] if appointment was made at [place] - otherwise the number listed above the appointment time at the [facility].    At this one week EKG appointment, please ask the medical assistant or nurse taking care of you to transfer your prescription over from the hospital [facility] ([facility] to your home pharmacy that you normally use if this has not already been done already.     You will have a follow up in the EP office in about 4-6 weeks. If you have any questions or concerns prior to this visit, please contact Dr. Figueroa's office at [phone]. If you have any significant issues after hours or on the weekends, please call the on call cardiology number at [phone].    COMMON MEDICATION INTERACTION SHEET:  Please refer to this medication list prior to beginning any new medications while on Tikosyn:

## 2024-08-30 NOTE — PROGRESS NOTES
Discharge Summary - Sheba Bowers 73 y.o. female MRN: [id number]    Unit/Bed#: BE CATH LAB ROOM Encounter: [id number]      Admission Date: 8/30/2024   Discharge Date: 9/2/2024    Discharge Diagnosis:   Early persistent atrial fibrillation  - anticoagulated with Eliquis / [id number] score of 4 (HTN, DM, age, sex)  - EF of 50-55% per echo 4/2024   - rate control: metoprolol prior to admission, currently held  - antiarrhythmic therapy: none prior to admission --- currently undergoing Sotalol initiation  - prior cardioversion: 4/5/2024 in [place]  - now status post ablation of atrial fibrillation with pulmonary vein isolation, posterior wall, and CTI line with PFA by Dr. Figueroa at 8/30/2024     Medtronic loop recorder in situ  - implanted at time of ablation on 8/30/2024      Acute on chronic HFpEF, with LVEF 50-55% per echo 4/2024  - mild volume overload noted in the post ablation setting, improved with IV diuretic  - maintained on torsemide and spironolactone as an outpatient, will be continued     Essential hypertension  - maintained on metoprolol as an outpatient along with diuretic     Insulin dependant diabetes mellitus     Obesity with BMI of 30      Procedures Performed:   Pulse field ablation of atrial fibrillation    Ablation 1-pulmonary vein isolation  Ablation 2-left atrial posterior wall isolation  2. Pulse field ablation of atrial flutter  Ablation 3-CTI dependent flutter ablation thank you any you know that  3. Limited electrophysiologic measurement   4. 3-D electro-anatomic mapping using NavX system  5. Intracardiac echocardiography  6. Transeptal  7. Access under USG with seldinger technique  8. Loop implant   Orders Placed This Encounter   Procedures    Cardiac ep lab eps/ablations       Consultants: None      HPI: Please refer to the initial history and physical as well as procedure notes for full details. Briefly, Sheba Bowers is a 73 y.o. year old female with persistent atrial fibrillation,  chronic diastolic congestive heart failure, essential pretension, insulin-dependent diabetes mellitus, obesity with BMI of 30, and anxiety.     Patient had been seen in the general cardiology office in May of this year after returning from [place] where she was treated for atrial fibrillation with cardioversion but reverted back to atrial fibrillation.  It was felt that she would benefit from electrophysiology evaluation and was seen by Dr. Figueroa in August of this year at which point ablation along with medication initiation along with loop recorder implantation was recommended. She presented this hospital admission to undergo this procedure and initiation of sotalol as it is more cost effective.     Hospital Course: Sheba Bowers presented [facility]. After the procedure was explained in detail and consent was obtained, she underwent ablation of atrial fibrillation and flutter with PFA along with loop recorder implantation without complications. She tolerated the procedure well. In the postprocedure setting, she was initiated on sotalol 80 mg twice daily after review of her QTC interval and creatinine clearance, and also given the fact that she is on venlafaxine at baseline.     On her first night, there were no acute issues or events overnight. The following morning she denied all cardiac complaints, including chest pain/pressure, dyspnea, palpitations, dizziness, lightheadedness, or syncope. Her vital signs were reviewed and labs are stable.  She had mild hypotension at time, however with subsequent spontaneous improvement with which she was asymptomatic.  Telemetry showed normal sinus rhythm without recurrent arrhythmia. Her groins were soft without significant hematoma or recurrent bleeding.    On POD #2 she reported chest pressure, lower extremity swelling, and symptoms consistent with mild fluid overload.  She was given IV Lasix with improvement, but remained in the hospital another 24  hours for ongoing monitoring.    On POD #3 there is reported nausea early in the morning, however by the time of our examination she felt better and was eager for discharge.  A chest x-ray was obtained which was not formally read but did not show significant signs of fluid overload.  Physical exam also did not reveal significant JVD and her lungs were clear.  As she is currently feeling better, she was deemed stable for discharge.    She underwent the first 6 doses of sotalol without complications. Serial EKGs were performed 2 hours after each dose of AAD.  There were no significant changes in QT/QTc with intiating doses.  There were no significant occurrence of ventricular ectopy on telemetry.  Electrolytes and renal function were monitored throughout her stay and remained stable.  Her hemoglobin and other counts are downtrending throughout her admission, however she was not tachycardic and her blood pressure remained stable.  It is recommended that this be monitored as an outpatient.    Review of Systems   Constitutional: Negative for fever and malaise/fatigue.   Cardiovascular:  Negative for chest pain, dyspnea on exertion, irregular heartbeat, leg swelling, near-syncope, orthopnea, palpitations, paroxysmal nocturnal dyspnea and syncope.   Gastrointestinal:  Positive for nausea.   All other systems reviewed and are negative.        On the day of discharge, physical exam was as follows:  GEN: NAD, alert and oriented x 3, well appearing  SKIN: dry without significant lesions or rashes  HEENT: NCAT, PERRL, EOMs intact  NECK: No JVD appreciated  CARDIOVASCULAR: RRR, normal S1, S2 without murmurs, rubs, or gallops appreciated  LUNGS: Clear to auscultation bilaterally without wheezes, rhonchi, or rales  ABDOMEN: Soft, nontender, nondistended  EXTREMITIES/VASCULAR: perfused without clubbing, cyanosis, or LE edema b/l  PSYCH: Normal mood and affect  NEURO: CN ll-Xll grossly intact    EKG on the day of discharge not  available yet in MUSE, but personally reviewed and showed stable QTc.      At time of discharge, patient was ambulating the cardiac unit without complaints of  lightheadedness, presyncope, syncope, chest pain, shortness of breath, orthopnea, PND, palpitations, or bleeding problems. Patient received education regarding sotalol - avoiding missed doses, pharmacy moitoring for drug to drug interactions, and concerning signs and symptoms that would prompt urgent evaluation.      She was given routine post ablation discharge instructions and restrictions, and these were explained in detail. She was given a one-week EKG appointment after starting sotalol (at the time of appointment with general cardiology NP) along with a follow up with Daniel Rodríguez PA-C in 4-6 weeks in the office. She was also instructed to follow up with her primary cardiologist as previously instructed.    In terms of her medications, her metoprolol was held and sotalol 80 mg twice daily was initiated.    She is stable for discharge at this time with all questions answered. She was discussed in detail with Dr. Figueroa who is in agreement with this discharge summary.       Discharge Medications:  See after visit summary for reconciled discharge medications provided to patient and family.      Medications Prior to Admission:     apixaban (ELIQUIS) 5 mg    cholestyramine (QUESTRAN) 4 g packet    insulin aspart (NovoLOG) 100 Units/mL injection pen    metFORMIN (GLUCOPHAGE) 500 mg tablet    metoprolol succinate (TOPROL-XL) 25 mg 24 hr tablet    multivitamin (THERAGRAN) TABS    spironolactone (ALDACTONE) 25 mg tablet    torsemide (DEMADEX) 5 MG tablet    venlafaxine (EFFEXOR-XR) 37.5 mg 24 hr capsule    venlafaxine (EFFEXOR-XR) 75 mg 24 hr capsule    Basaglar KwikPen 100 units/mL SOPN    BD Pen Needle Blanquita 2nd Gen 32G X 4 MM MISC      Pertininet Labs/diagnostics:  CBC with diff:   Results from last 7 days   Lab Units 08/30/24  0850   WBC Thousand/uL 8.11    HEMOGLOBIN g/dL 13.9   HEMATOCRIT % 39.7   MCV fL 93   PLATELETS Thousands/uL 264   RBC Million/uL 4.26   MCH pg 32.6   MCHC g/dL 35.0   RDW % 12.3   MPV fL 9.9   NRBC AUTO /100 WBCs 0       BMP:  Results from last 7 days   Lab Units 08/30/24  0850   POTASSIUM mmol/L 4.2   CHLORIDE mmol/L 105   CO2 mmol/L 26   BUN mg/dL 19   CREATININE mg/dL 0.71   CALCIUM mg/dL 9.0       Magnesium:       Coags:   Results from last 7 days   Lab Units 08/30/24  0850   INR  1.20*         Complications: none    Condition at Discharge: good     Discharge instructions/Information to patient and family:   See after visit summary for information provided to patient and family.      Provisions for Follow-Up Care:  See after visit summary for information related to follow-up care and any pertinent home health orders.      Disposition: Home    Planned Readmission: No    Discharge Statement   I spent 45 minutes minutes discharging the patient. This time was spent on the day of discharge. I had direct contact with the patient on the day of discharge. Additional documentation is required if more than 30 minutes were spent on discharge. Evaluating the incision, discussing discharge instructions and restrictions, arranging follow up appointments, discussing medications

## 2024-08-30 NOTE — PROGRESS NOTES
Progress Note - Electrophysiology  Sheba Bowers 73 y.o. female MRN: [id number]  Unit/Bed#: BE CATH LAB ROOM Encounter: [id number]      Assessment:  Early persistent atrial fibrillation  - anticoagulated with Eliquis / [id number] score of 4 (HTN, DM, age, sex)  - EF of 50-55% per echo 4/2024   - rate control: metoprolol prior to admission, currently held  - antiarrhythmic therapy: none prior to admission --- currently undergoing Sotalol initiation  - prior cardioversion: 4/5/2024 in [place]  - now status post ablation of atrial fibrillation with pulmonary vein isolation, posterior wall, and CTI line with PFA by Dr. Figueroa at 8/30/2024    Medtronic loop recorder in situ  - implanted at time of ablation on 8/30/2024     Chronic HFpEF, with LVEF 50-55% per echo 4/2024  - maintained on torsemide and spironolactone as an outpatient    Essential hypertension  - maintained on metoprolol as an outpatient along with diuretic    Insulin dependant diabetes mellitus    Obesity with BMI of 30    Anxiety   - maintained on venlafaxine        Plan:  Patient is currently in normal sinus rhythm.  She is status post ablation of atrial fibrillation with groin incisions clean, dry, and intact without signs of ecchymosis or hematoma.  Bilateral groin sutures were removed without incident.  She was given postop ablation restrictions.    Last night, she was initiated on sotalol 80 mg twice daily given creatinine clearance along with the fact that she is on venlafaxine as an outpatient.  Metoprolol was held as well.  She has already received 2 doses of sotalol at the current dose of 80mg twice daily.     Upon review of the last EKG, Qtc interval appears stable - unfortunately I am unable to view this in MUSE and thus unable to changes again.  She has a small signal at baseline thus making it difficult to calculate QTc (see ECG below), however it appears stable.. Will continue to load at this current dose and monitor EKG's two hours post  "dose. Will also draw AM labs to check electrolytes and telemetry as well which has not shown ventricular ectopy thus far.     She has had low blood pressures at times, however denies associated symptoms with this.  She reports that this is not a new issue for her, and happens at home.  We will continue to monitor.      Subjective/Objective   Subjective: Sheba Bowers is a 73 y.o. year old female with persistent atrial fibrillation, chronic diastolic congestive heart failure, essential pretension, insulin-dependent diabetes mellitus, obesity with BMI of 30, and anxiety . She is hospital stay day 2 and has been tolerating sotalol well.     She denies significant issues overnight, including chest pain or pressure, worsening shortness of breath, dizziness/lightheadedness, or issues with edema.  In general she does have issues with balance due to an inner ear/nerve issue, but denies falls.      TELE: Normal sinus rhythm, artifact noted but no significant arrhythmias      EKG:   After 2nd dose of sotalol-        Objective:  Vitals: BP 97/53   Pulse 70   Temp 97.5 °F (36.4 °C)   Resp 22   Ht 5' 1\" (1.549 m)   Wt 73.9 kg (163 lb)   SpO2 95%   BMI 30.80 kg/m²     Vitals:    08/30/24 0919 08/30/24 1130   Weight: 73.9 kg (163 lb) 73.9 kg (163 lb)     Orthostatic Blood Pressures      Flowsheet Row Most Recent Value   Blood Pressure 97/53 filed at 08/30/2024 1515   Patient Position - Orthostatic VS Lying filed at 08/30/2024 0919              Intake/Output Summary (Last 24 hours) at 8/30/2024 1606  Last data filed at 8/30/2024 1439  Gross per 24 hour   Intake 1650 ml   Output --   Net 1650 ml       Invasive Devices       Peripheral Intravenous Line  Duration             Peripheral IV 08/30/24 Dorsal (posterior);Left Forearm <1 day    Peripheral IV 08/30/24 Dorsal (posterior);Right Forearm <1 day                              Scheduled Meds:  Current Facility-Administered Medications   Medication Dose Route Frequency " Provider Last Rate    acetaminophen  650 mg Oral Q4H PRN Marli Sodherman PA-C      albuterol  2.5 mg Nebulization Once PRN Casandra Decker CRNA      apixaban  5 mg Oral BID Marli Sodherman PA-C      [START ON 8/31/2024] cholestyramine sugar free  4 g Oral Every Other Day Marli Sodherman, PA-C      fentaNYL  25 mcg Intravenous Q5 Min PRN Casandra Decker CRNA      insulin lispro  1-5 Units Subcutaneous TID AC Marli Soden PA-C      insulin lispro  1-5 Units Subcutaneous HS Marli Soden, PA-C      insulin lispro  5 Units Subcutaneous TID With Meals Marli SodCOLIN sutton-C      sotalol  80 mg Oral Q12H JUSTEN Marli Soden, PA-C      [START ON 8/31/2024] spironolactone  25 mg Oral Daily Marli Soden, PA-C      [START ON 8/31/2024] torsemide  5 mg Oral Daily Marli Soden, PA-C      [START ON 8/31/2024] venlafaxine  75 mg Oral Daily Marli Soden, PA-C       Continuous Infusions:   PRN Meds:.  acetaminophen    albuterol    fentaNYL    Review of Systems   Constitutional: Negative for fever and malaise/fatigue.   Cardiovascular:  Negative for chest pain, dyspnea on exertion, irregular heartbeat, leg swelling, near-syncope, orthopnea, palpitations, paroxysmal nocturnal dyspnea and syncope.   All other systems reviewed and are negative.        Physical Exam:   GEN: NAD, alert and oriented x 3, well appearing  SKIN: dry without significant lesions or rashes  HEENT: NCAT, PERRL, EOMs intact  NECK: No JVD appreciated  CARDIOVASCULAR: RRR, normal S1, S2 without murmurs, rubs, or gallops appreciated  LUNGS: Clear to auscultation bilaterally without wheezes, rhonchi, or rales  ABDOMEN: Soft, nontender, nondistended  EXTREMITIES/VASCULAR: perfused without clubbing, cyanosis, or LE edema b/l  PSYCH: Normal mood and affect  NEURO: CN ll-Xll grossly intact              Lab Results: I have personally reviewed pertinent lab results.    Results from last 7 days   Lab Units 08/30/24  0850   WBC Thousand/uL 8.11   HEMOGLOBIN g/dL 13.9   HEMATOCRIT % 39.7    PLATELETS Thousands/uL 264     Results from last 7 days   Lab Units 08/30/24  0850   POTASSIUM mmol/L 4.2   CHLORIDE mmol/L 105   CO2 mmol/L 26   BUN mg/dL 19   CREATININE mg/dL 0.71   CALCIUM mg/dL 9.0     Results from last 7 days   Lab Units 08/30/24  0850   INR  1.20*           Imaging: I have personally reviewed pertinent reports.    No results found for this or any previous visit.      VTE Pharmacologic Prophylaxis: Eliquis

## 2024-08-30 NOTE — ANESTHESIA PREPROCEDURE EVALUATION
Procedure:  Cardiac eps/afib ablation PFA (Chest)  Cardiac loop recorder implant (Chest)    Relevant Problems   CARDIO   (+) Chronic diastolic congestive heart failure (HCC)   (+) Non-occlusive coronary artery disease   (+) Paroxysmal A-fib (HCC)   (+) Primary hypertension      ENDO   (+) Type 2 diabetes mellitus without complication, with long-term current use of insulin (HCC)      Other   (+) Current use of long term anticoagulation      EKG: SR with PVCs    CBC & CMP : WNL         Anesthesia Plan  ASA Score- 3     Anesthesia Type- general with ASA Monitors.         Additional Monitors:     Airway Plan:            Plan Factors-            Patient is not a current smoker.  Patient did not smoke on day of surgery.            Induction- intravenous.    Postoperative Plan- . Planned trial extubation    Perioperative Resuscitation Plan - Level 1 - Full Code.       Informed Consent-

## 2024-08-30 NOTE — H&P
H&P Exam - Electrophysiology  Sheba Bowers 73 y.o. female MRN: [id number]  Unit/Bed#: [facility] CATH LAB ROOM Encounter: [id number]    Assessment & Plan     Assessment:  Early persistent atrial fibrillation  - anticoagulated with Eliquis / [id number] score of 4 (HTN, DM, age, sex)  - EF of 50-55% per echo 4/2024   - rate control: metoprolol prior to admission  - antiarrhythmic therapy: none prior to admission  - prior cardioversion: 4/5/2024 in [place]  - prior ablation: none  Chronic diastolic congestive heart failure  - maintained on torsemide and spironolactone as an outpatient  Essential hypertension  - maintained on metoprolol as an outpatient along with diuretic  Insulin dependant diabetes mellitus  Obesity with BMI of 30  Anxiety   - maintained on venlafaxine    Plan:  Patient presents today to undergo ablation of atrial fibrillation and loop recorder implantation.  She will also be started on antiarrhythmic initiation of sotalol.      History of Present Illness   HPI:  Sheba Bowers is a 73 y.o. year old female with early persistent atrial fibrillation.    Patient had been seen in the general cardiology office in May of this year after returning from [place] where she was treated for atrial fibrillation with cardioverted but reverted back to atrial fibrillation.  It was felt that she would benefit from electrophysiology evaluation was seen by Dr. Figueroa in August of this year at which point ablation along with medication initiation along with loop recorder implantation was recommended and she presents today to undergo this procedure.    EKG: Atrial fibrillation    Review of Systems  ROS as noted above, otherwise 12 point review of systems was performed and is negative.     Historical Information   History reviewed. No pertinent past medical history.  History reviewed. No pertinent surgical history.  Family History: History reviewed. No pertinent family history.    Social History   Social History     Substance  "and Sexual Activity   Alcohol Use None     Social History     Substance and Sexual Activity   Drug Use Not on file     Social History     Tobacco Use   Smoking Status Never   Smokeless Tobacco Not on file       Meds/Allergies   all medications and allergies reviewed  Home Medications:   Medications Prior to Admission:     apixaban (ELIQUIS) 5 mg    cholestyramine (QUESTRAN) 4 g packet    insulin aspart (NovoLOG) 100 Units/mL injection pen    metFORMIN (GLUCOPHAGE) 500 mg tablet    metoprolol succinate (TOPROL-XL) 25 mg 24 hr tablet    multivitamin (THERAGRAN) TABS    spironolactone (ALDACTONE) 25 mg tablet    torsemide (DEMADEX) 5 MG tablet    venlafaxine (EFFEXOR-XR) 37.5 mg 24 hr capsule    venlafaxine (EFFEXOR-XR) 75 mg 24 hr capsule    Basaglar KwikPen 100 units/mL SOPN    BD Pen Needle Blanquita 2nd Gen 32G X 4 MM MISC    Allergies   Allergen Reactions    Nickel Rash     Skin irritation    Latex Itching     Hands turn red       Objective   Vitals: There were no vitals taken for this visit.      No intake or output data in the 24 hours ending 08/30/24 0916    Invasive Devices       Peripheral Intravenous Line  Duration             Peripheral IV 08/30/24 Dorsal (posterior);Left Forearm <1 day    Peripheral IV 08/30/24 Dorsal (posterior);Right Forearm <1 day                    Physical Exam   GEN: NAD, alert and oriented, well appearing  SKIN: dry without significant lesions or rashes  HEENT: NCAT, PERRL, EOMs intact  NECK: No JVD appreciated  CARDIOVASCULAR: irregular  LUNGS: Good respiratory effort with no audible wheezes  PSYCH: Normal mood and affect  NEURO: CN ll-Xll grossly intact      Lab Results: I have personally reviewed pertinent lab results.    Results from last 7 days   Lab Units 08/30/24  0850   WBC Thousand/uL 8.11   HEMOGLOBIN g/dL 13.9   HEMATOCRIT % 39.7   PLATELETS Thousands/uL 264           Invalid input(s): \"LABGLOM\"              Imaging: I have personally reviewed pertinent reports.    No results " found for this or any previous visit.      Code Status: Level 1 - Full Code    ** Please Note: Dictation voice to text software may have been used in the creation of this document. **

## 2024-08-31 LAB
ANION GAP SERPL CALCULATED.3IONS-SCNC: 12 MMOL/L (ref 4–13)
BUN SERPL-MCNC: 30 MG/DL (ref 5–25)
CALCIUM SERPL-MCNC: 8 MG/DL (ref 8.4–10.2)
CHLORIDE SERPL-SCNC: 106 MMOL/L (ref 96–108)
CO2 SERPL-SCNC: 22 MMOL/L (ref 21–32)
CREAT SERPL-MCNC: 0.86 MG/DL (ref 0.6–1.3)
ERYTHROCYTE [DISTWIDTH] IN BLOOD BY AUTOMATED COUNT: 13.3 % (ref 11.6–15.1)
GFR SERPL CREATININE-BSD FRML MDRD: 66 ML/MIN/1.73SQ M
GLUCOSE SERPL-MCNC: 213 MG/DL (ref 65–140)
GLUCOSE SERPL-MCNC: 218 MG/DL (ref 65–140)
GLUCOSE SERPL-MCNC: 224 MG/DL (ref 65–140)
GLUCOSE SERPL-MCNC: 238 MG/DL (ref 65–140)
GLUCOSE SERPL-MCNC: 292 MG/DL (ref 65–140)
HCT VFR BLD AUTO: 34.6 % (ref 34.8–46.1)
HGB BLD-MCNC: 11.4 G/DL (ref 11.5–15.4)
MCH RBC QN AUTO: 31.7 PG (ref 26.8–34.3)
MCHC RBC AUTO-ENTMCNC: 32.9 G/DL (ref 31.4–37.4)
MCV RBC AUTO: 96 FL (ref 82–98)
PLATELET # BLD AUTO: 206 THOUSANDS/UL (ref 149–390)
PMV BLD AUTO: 10.6 FL (ref 8.9–12.7)
POTASSIUM SERPL-SCNC: 4.5 MMOL/L (ref 3.5–5.3)
RBC # BLD AUTO: 3.6 MILLION/UL (ref 3.81–5.12)
SODIUM SERPL-SCNC: 140 MMOL/L (ref 135–147)
WBC # BLD AUTO: 12.26 THOUSAND/UL (ref 4.31–10.16)

## 2024-08-31 PROCEDURE — 82948 REAGENT STRIP/BLOOD GLUCOSE: CPT

## 2024-08-31 PROCEDURE — 80048 BASIC METABOLIC PNL TOTAL CA: CPT | Performed by: PHYSICIAN ASSISTANT

## 2024-08-31 PROCEDURE — 85027 COMPLETE CBC AUTOMATED: CPT | Performed by: PHYSICIAN ASSISTANT

## 2024-08-31 RX ADMIN — APIXABAN 5 MG: 5 TABLET, FILM COATED ORAL at 17:01

## 2024-08-31 RX ADMIN — APIXABAN 5 MG: 5 TABLET, FILM COATED ORAL at 08:04

## 2024-08-31 RX ADMIN — INSULIN LISPRO 1 UNITS: 100 INJECTION, SOLUTION INTRAVENOUS; SUBCUTANEOUS at 21:31

## 2024-08-31 RX ADMIN — SOTALOL HYDROCHLORIDE 80 MG: 80 TABLET ORAL at 09:20

## 2024-08-31 RX ADMIN — INSULIN LISPRO 2 UNITS: 100 INJECTION, SOLUTION INTRAVENOUS; SUBCUTANEOUS at 08:06

## 2024-08-31 RX ADMIN — SOTALOL HYDROCHLORIDE 80 MG: 80 TABLET ORAL at 22:18

## 2024-08-31 RX ADMIN — INSULIN LISPRO 2 UNITS: 100 INJECTION, SOLUTION INTRAVENOUS; SUBCUTANEOUS at 16:58

## 2024-08-31 RX ADMIN — TORSEMIDE 5 MG: 5 TABLET ORAL at 09:20

## 2024-08-31 RX ADMIN — INSULIN LISPRO 5 UNITS: 100 INJECTION, SOLUTION INTRAVENOUS; SUBCUTANEOUS at 16:58

## 2024-08-31 RX ADMIN — ACETAMINOPHEN 650 MG: 325 TABLET ORAL at 21:26

## 2024-08-31 RX ADMIN — INSULIN LISPRO 3 UNITS: 100 INJECTION, SOLUTION INTRAVENOUS; SUBCUTANEOUS at 11:57

## 2024-08-31 RX ADMIN — CHOLESTYRAMINE 4 G: 4 POWDER, FOR SUSPENSION ORAL at 08:07

## 2024-08-31 RX ADMIN — VENLAFAXINE HYDROCHLORIDE 75 MG: 75 CAPSULE, EXTENDED RELEASE ORAL at 08:05

## 2024-08-31 RX ADMIN — SPIRONOLACTONE 25 MG: 25 TABLET, FILM COATED ORAL at 09:20

## 2024-08-31 RX ADMIN — INSULIN LISPRO 5 UNITS: 100 INJECTION, SOLUTION INTRAVENOUS; SUBCUTANEOUS at 11:57

## 2024-08-31 RX ADMIN — INSULIN LISPRO 5 UNITS: 100 INJECTION, SOLUTION INTRAVENOUS; SUBCUTANEOUS at 08:06

## 2024-09-01 LAB
ANION GAP SERPL CALCULATED.3IONS-SCNC: 6 MMOL/L (ref 4–13)
BUN SERPL-MCNC: 28 MG/DL (ref 5–25)
CALCIUM SERPL-MCNC: 8.4 MG/DL (ref 8.4–10.2)
CHLORIDE SERPL-SCNC: 106 MMOL/L (ref 96–108)
CO2 SERPL-SCNC: 25 MMOL/L (ref 21–32)
CREAT SERPL-MCNC: 0.76 MG/DL (ref 0.6–1.3)
ERYTHROCYTE [DISTWIDTH] IN BLOOD BY AUTOMATED COUNT: 13.3 % (ref 11.6–15.1)
GFR SERPL CREATININE-BSD FRML MDRD: 76 ML/MIN/1.73SQ M
GLUCOSE SERPL-MCNC: 149 MG/DL (ref 65–140)
GLUCOSE SERPL-MCNC: 182 MG/DL (ref 65–140)
GLUCOSE SERPL-MCNC: 192 MG/DL (ref 65–140)
GLUCOSE SERPL-MCNC: 192 MG/DL (ref 65–140)
GLUCOSE SERPL-MCNC: 211 MG/DL (ref 65–140)
HCT VFR BLD AUTO: 33.1 % (ref 34.8–46.1)
HGB BLD-MCNC: 11.1 G/DL (ref 11.5–15.4)
MCH RBC QN AUTO: 32.2 PG (ref 26.8–34.3)
MCHC RBC AUTO-ENTMCNC: 33.5 G/DL (ref 31.4–37.4)
MCV RBC AUTO: 96 FL (ref 82–98)
PLATELET # BLD AUTO: 172 THOUSANDS/UL (ref 149–390)
PMV BLD AUTO: 10.6 FL (ref 8.9–12.7)
POTASSIUM SERPL-SCNC: 4.5 MMOL/L (ref 3.5–5.3)
RBC # BLD AUTO: 3.45 MILLION/UL (ref 3.81–5.12)
SODIUM SERPL-SCNC: 137 MMOL/L (ref 135–147)
WBC # BLD AUTO: 12.43 THOUSAND/UL (ref 4.31–10.16)

## 2024-09-01 PROCEDURE — NC001 PR NO CHARGE: Performed by: PHYSICIAN ASSISTANT

## 2024-09-01 PROCEDURE — 93005 ELECTROCARDIOGRAM TRACING: CPT

## 2024-09-01 PROCEDURE — 85027 COMPLETE CBC AUTOMATED: CPT | Performed by: PHYSICIAN ASSISTANT

## 2024-09-01 PROCEDURE — 80048 BASIC METABOLIC PNL TOTAL CA: CPT | Performed by: PHYSICIAN ASSISTANT

## 2024-09-01 PROCEDURE — 82948 REAGENT STRIP/BLOOD GLUCOSE: CPT

## 2024-09-01 RX ORDER — ACETAMINOPHEN 325 MG/1
650 TABLET ORAL EVERY 4 HOURS PRN
Status: DISCONTINUED | OUTPATIENT
Start: 2024-09-01 | End: 2024-09-02 | Stop reason: HOSPADM

## 2024-09-01 RX ORDER — FUROSEMIDE 10 MG/ML
20 INJECTION INTRAMUSCULAR; INTRAVENOUS ONCE
Status: COMPLETED | OUTPATIENT
Start: 2024-09-01 | End: 2024-09-01

## 2024-09-01 RX ORDER — POTASSIUM CHLORIDE 750 MG/1
10 TABLET, EXTENDED RELEASE ORAL ONCE
Status: COMPLETED | OUTPATIENT
Start: 2024-09-01 | End: 2024-09-01

## 2024-09-01 RX ORDER — FUROSEMIDE 10 MG/ML
20 INJECTION INTRAMUSCULAR; INTRAVENOUS ONCE
Status: DISCONTINUED | OUTPATIENT
Start: 2024-09-01 | End: 2024-09-01

## 2024-09-01 RX ADMIN — SPIRONOLACTONE 25 MG: 25 TABLET, FILM COATED ORAL at 09:17

## 2024-09-01 RX ADMIN — FUROSEMIDE 20 MG: 10 INJECTION, SOLUTION INTRAMUSCULAR; INTRAVENOUS at 12:31

## 2024-09-01 RX ADMIN — APIXABAN 5 MG: 5 TABLET, FILM COATED ORAL at 09:17

## 2024-09-01 RX ADMIN — INSULIN LISPRO 1 UNITS: 100 INJECTION, SOLUTION INTRAVENOUS; SUBCUTANEOUS at 22:26

## 2024-09-01 RX ADMIN — VENLAFAXINE HYDROCHLORIDE 75 MG: 75 CAPSULE, EXTENDED RELEASE ORAL at 09:18

## 2024-09-01 RX ADMIN — INSULIN LISPRO 5 UNITS: 100 INJECTION, SOLUTION INTRAVENOUS; SUBCUTANEOUS at 12:31

## 2024-09-01 RX ADMIN — INSULIN LISPRO 2 UNITS: 100 INJECTION, SOLUTION INTRAVENOUS; SUBCUTANEOUS at 12:31

## 2024-09-01 RX ADMIN — SOTALOL HYDROCHLORIDE 80 MG: 80 TABLET ORAL at 09:19

## 2024-09-01 RX ADMIN — POTASSIUM CHLORIDE 10 MEQ: 750 TABLET, EXTENDED RELEASE ORAL at 12:31

## 2024-09-01 RX ADMIN — APIXABAN 5 MG: 5 TABLET, FILM COATED ORAL at 17:24

## 2024-09-01 RX ADMIN — ACETAMINOPHEN 650 MG: 325 TABLET ORAL at 19:40

## 2024-09-01 RX ADMIN — INSULIN LISPRO 1 UNITS: 100 INJECTION, SOLUTION INTRAVENOUS; SUBCUTANEOUS at 08:31

## 2024-09-01 RX ADMIN — INSULIN LISPRO 5 UNITS: 100 INJECTION, SOLUTION INTRAVENOUS; SUBCUTANEOUS at 08:31

## 2024-09-01 RX ADMIN — INSULIN LISPRO 5 UNITS: 100 INJECTION, SOLUTION INTRAVENOUS; SUBCUTANEOUS at 17:24

## 2024-09-01 RX ADMIN — SOTALOL HYDROCHLORIDE 80 MG: 80 TABLET ORAL at 21:09

## 2024-09-01 RX ADMIN — TORSEMIDE 5 MG: 5 TABLET ORAL at 09:18

## 2024-09-01 NOTE — PROGRESS NOTES
Progress Note - Electrophysiology-Cardiology (EP)   Sheba Bowers 73 y.o. female MRN: [id number]  Unit/Bed#: -[facility] Encounter: [id number]      Assessment:  Early persistent atrial fibrillation  - anticoagulated with Eliquis / [id number] score of 4 (HTN, DM, age, sex)  - EF of 50-55% per echo 4/2024   - rate control: metoprolol prior to admission, currently held  - antiarrhythmic therapy: none prior to admission --- currently undergoing Sotalol initiation  - prior cardioversion: 4/5/2024 in [place]  - now status post ablation of atrial fibrillation with pulmonary vein isolation, posterior wall, and CTI line with PFA by Dr. Figueroa at 8/30/2024     Medtronic loop recorder in situ  - implanted at time of ablation on 8/30/2024      Chronic HFpEF, with LVEF 50-55% per echo 4/2024, mild volume overload noted today  - maintained on torsemide and spironolactone as an outpatient     Essential hypertension  - maintained on metoprolol as an outpatient along with diuretic     Insulin dependant diabetes mellitus     Obesity with BMI of 30     Anxiety   - maintained on venlafaxine        Plan:  She appears mildly volume overloaded on physical exam, and she reports symptoms suggestive of this as well. She has already received torsemide 5 mg and spironolactone 25 mg this morning, recommend giving IV lasix 20 mg X 1 along with k-dur 10 meq x 1 and assess response. Close I/o monitoring with 2L fluid restriction.     She will remain in the hospital at least another 24 hours to assess response. Will recheck AM labs, and continue to monitor telemetry.  Fortunately, from a rhythm standpoint she is stable and maintaining sinus rhythm.  There is a mention of bradycardia on telemetry, however these are artifact and false reads.  She has had no bradycardia or pauses, with rates often in the 60s and 70s.  EKGs reviewed and show stable QTc without significant prolongation, will continue sotalol 80 mg every 12 hour dosing.    She understands  "and agrees with this plan.  Potential discharge tomorrow if symptoms improved.      Subjective/Objective   Chief Complaint: slight chest pressure    Subjective: She reports slight chest pressure, and slight shortness of breath.  She denies cough, orthopnea, or paroxysmal nocturnal dyspnea.  She also admits to lower extremity swelling.  The symptoms are not as severe as when she was previously hospitalized with volume overload, but she feels that she is retaining some fluid.  She otherwise denies dizziness, lightheadedness, or palpitations.      Objective:     Vitals: /65   Pulse 61   Temp 98.9 °F (37.2 °C)   Resp 18   Ht 5' 1\" (1.549 m)   Wt 73.9 kg (163 lb)   SpO2 94%   BMI 30.80 kg/m²   Vitals:    08/30/24 0919 08/30/24 1130   Weight: 73.9 kg (163 lb) 73.9 kg (163 lb)     Orthostatic Blood Pressures      Flowsheet Row Most Recent Value   Blood Pressure 122/65 filed at 09/01/2024 1027   Patient Position - Orthostatic VS Lying filed at 08/31/2024 2252              Intake/Output Summary (Last 24 hours) at 9/1/2024 1215  Last data filed at 9/1/2024 0915  Gross per 24 hour   Intake 842 ml   Output 0 ml   Net 842 ml       Invasive Devices       Peripheral Intravenous Line  Duration             Peripheral IV 08/30/24 Dorsal (posterior);Left Forearm 2 days    Peripheral IV 08/30/24 Dorsal (posterior);Right Forearm 2 days                                Scheduled Meds:  Current Facility-Administered Medications   Medication Dose Route Frequency Provider Last Rate    acetaminophen  650 mg Oral Q4H PRN Marli Brown, PA-C      apixaban  5 mg Oral BID Marli SodCOLIN sutton-C      cholestyramine sugar free  4 g Oral Every Other Day Marli Sodherman, PA-C      furosemide  20 mg Intravenous Once Caprice Harvey PA-C      insulin lispro  1-5 Units Subcutaneous TID AC COLIN Segura-JAM      insulin lispro  1-5 Units Subcutaneous HS COLIN Segura-C      insulin lispro  5 Units Subcutaneous TID With Meals Marli Brown PA-C      " potassium chloride  10 mEq Oral Once Caprice Harvey PA-C      sotalol  80 mg Oral Q12H [facility] Marli Soden, TUAN      spironolactone  25 mg Oral Daily Marli SodenTUAN      torsemide  5 mg Oral Daily Marli Soden, PA-JAM      venlafaxine  75 mg Oral Daily Marli Soden, TUAN       Continuous Infusions:   PRN Meds:.  acetaminophen    Review of Systems   Constitutional: Negative for fever and malaise/fatigue.   Cardiovascular:  Positive for dyspnea on exertion and leg swelling. Negative for chest pain, irregular heartbeat, near-syncope, orthopnea, palpitations, paroxysmal nocturnal dyspnea and syncope.   All other systems reviewed and are negative.        Physical Exam:   GEN: NAD, alert and oriented x 3, well appearing  SKIN: dry without significant lesions or rashes  HEENT: NCAT, PERRL, EOMs intact  NECK: mild JVD noted  CARDIOVASCULAR: RRR, normal S1, S2 without murmurs, rubs, or gallops appreciated  LUNGS: Clear to auscultation bilaterally without wheezes, rhonchi, or rales  ABDOMEN: Soft, nontender, nondistended  EXTREMITIES/VASCULAR: perfused without clubbing, cyanosis; mild LE edema b/l  PSYCH: Normal mood and affect  NEURO: CN ll-Xll grossly intact                Lab Results: I have personally reviewed pertinent lab results.    Results from last 7 days   Lab Units 09/01/24  0828 08/31/24  0451 08/30/24  0850   WBC Thousand/uL 12.43* 12.26* 8.11   HEMOGLOBIN g/dL 11.1* 11.4* 13.9   HEMATOCRIT % 33.1* 34.6* 39.7   PLATELETS Thousands/uL 172 206 264     Results from last 7 days   Lab Units 09/01/24  0828 08/31/24  0451 08/30/24  0850   POTASSIUM mmol/L 4.5 4.5 4.2   CHLORIDE mmol/L 106 106 105   CO2 mmol/L 25 22 26   BUN mg/dL 28* 30* 19   CREATININE mg/dL 0.76 0.86 0.71   CALCIUM mg/dL 8.4 8.0* 9.0     Results from last 7 days   Lab Units 08/30/24  0850   INR  1.20*             Imaging: I have personally reviewed pertinent reports.      ECHO: No results found for this or any previous visit.      No results found for  this or any previous visit.        EKG:        TELEMETRY: Normal sinus rhythm, no arrhythmias noted, episodes of bradycardia noted, however upon further review these are inaccurate and due to artifact      VTE Pharmacologic Prophylaxis: Eliquis

## 2024-09-01 NOTE — PLAN OF CARE
Problem: PAIN - ADULT  Goal: Verbalizes/displays adequate comfort level or baseline comfort level  Description: Interventions:  - Encourage patient to monitor pain and request assistance  - Assess pain using appropriate pain scale  - Administer analgesics based on type and severity of pain and evaluate response  - Implement non-pharmacological measures as appropriate and evaluate response  - Consider cultural and social influences on pain and pain management  - Notify physician/advanced practitioner if interventions unsuccessful or patient reports new pain  Outcome: Progressing     Problem: INFECTION - ADULT  Goal: Absence or prevention of progression during hospitalization  Description: INTERVENTIONS:  - Assess and monitor for signs and symptoms of infection  - Monitor lab/diagnostic results  - Monitor all insertion sites, i.e. indwelling lines, tubes, and drains  - Monitor endotracheal if appropriate and nasal secretions for changes in amount and color  - [place] appropriate cooling/warming therapies per order  - Administer medications as ordered  - Instruct and encourage patient and family to use good hand hygiene technique  - Identify and instruct in appropriate isolation precautions for identified infection/condition  Outcome: Progressing

## 2024-09-02 ENCOUNTER — APPOINTMENT (INPATIENT)
Dept: RADIOLOGY | Facility: HOSPITAL | Age: 74
DRG: 273 | End: 2024-09-02
Payer: MEDICARE

## 2024-09-02 VITALS
RESPIRATION RATE: 18 BRPM | HEART RATE: 58 BPM | TEMPERATURE: 98.5 F | BODY MASS INDEX: 30.78 KG/M2 | DIASTOLIC BLOOD PRESSURE: 61 MMHG | OXYGEN SATURATION: 92 % | SYSTOLIC BLOOD PRESSURE: 109 MMHG | WEIGHT: 163 LBS | HEIGHT: 61 IN

## 2024-09-02 LAB
ANION GAP SERPL CALCULATED.3IONS-SCNC: 9 MMOL/L (ref 4–13)
ATRIAL RATE: 58 BPM
BUN SERPL-MCNC: 20 MG/DL (ref 5–25)
CALCIUM SERPL-MCNC: 8.4 MG/DL (ref 8.4–10.2)
CHLORIDE SERPL-SCNC: 102 MMOL/L (ref 96–108)
CO2 SERPL-SCNC: 27 MMOL/L (ref 21–32)
CREAT SERPL-MCNC: 0.67 MG/DL (ref 0.6–1.3)
ERYTHROCYTE [DISTWIDTH] IN BLOOD BY AUTOMATED COUNT: 12.8 % (ref 11.6–15.1)
GFR SERPL CREATININE-BSD FRML MDRD: 86 ML/MIN/1.73SQ M
GLUCOSE SERPL-MCNC: 204 MG/DL (ref 65–140)
GLUCOSE SERPL-MCNC: 208 MG/DL (ref 65–140)
GLUCOSE SERPL-MCNC: 245 MG/DL (ref 65–140)
HCT VFR BLD AUTO: 31.2 % (ref 34.8–46.1)
HGB BLD-MCNC: 10.4 G/DL (ref 11.5–15.4)
MCH RBC QN AUTO: 32.4 PG (ref 26.8–34.3)
MCHC RBC AUTO-ENTMCNC: 33.3 G/DL (ref 31.4–37.4)
MCV RBC AUTO: 97 FL (ref 82–98)
P AXIS: 84 DEGREES
PLATELET # BLD AUTO: 151 THOUSANDS/UL (ref 149–390)
PMV BLD AUTO: 10.9 FL (ref 8.9–12.7)
POTASSIUM SERPL-SCNC: 4.3 MMOL/L (ref 3.5–5.3)
PR INTERVAL: 168 MS
QRS AXIS: 60 DEGREES
QRSD INTERVAL: 82 MS
QT INTERVAL: 386 MS
QTC INTERVAL: 378 MS
RBC # BLD AUTO: 3.21 MILLION/UL (ref 3.81–5.12)
SODIUM SERPL-SCNC: 138 MMOL/L (ref 135–147)
T WAVE AXIS: 115 DEGREES
VENTRICULAR RATE: 58 BPM
WBC # BLD AUTO: 10.63 THOUSAND/UL (ref 4.31–10.16)

## 2024-09-02 PROCEDURE — 80048 BASIC METABOLIC PNL TOTAL CA: CPT | Performed by: PHYSICIAN ASSISTANT

## 2024-09-02 PROCEDURE — 85027 COMPLETE CBC AUTOMATED: CPT | Performed by: PHYSICIAN ASSISTANT

## 2024-09-02 PROCEDURE — NC001 PR NO CHARGE: Performed by: PHYSICIAN ASSISTANT

## 2024-09-02 PROCEDURE — 93010 ELECTROCARDIOGRAM REPORT: CPT | Performed by: INTERNAL MEDICINE

## 2024-09-02 PROCEDURE — 71045 X-RAY EXAM CHEST 1 VIEW: CPT

## 2024-09-02 PROCEDURE — 82948 REAGENT STRIP/BLOOD GLUCOSE: CPT

## 2024-09-02 RX ORDER — SOTALOL HYDROCHLORIDE 80 MG/1
80 TABLET ORAL EVERY 12 HOURS SCHEDULED
Qty: 60 TABLET | Refills: 6 | Status: SHIPPED | OUTPATIENT
Start: 2024-09-02

## 2024-09-02 RX ADMIN — SPIRONOLACTONE 25 MG: 25 TABLET, FILM COATED ORAL at 08:18

## 2024-09-02 RX ADMIN — SOTALOL HYDROCHLORIDE 80 MG: 80 TABLET ORAL at 08:18

## 2024-09-02 RX ADMIN — VENLAFAXINE HYDROCHLORIDE 75 MG: 75 CAPSULE, EXTENDED RELEASE ORAL at 08:18

## 2024-09-02 RX ADMIN — APIXABAN 5 MG: 5 TABLET, FILM COATED ORAL at 08:18

## 2024-09-02 RX ADMIN — TORSEMIDE 5 MG: 5 TABLET ORAL at 08:18

## 2024-09-02 RX ADMIN — ACETAMINOPHEN 650 MG: 325 TABLET ORAL at 08:18

## 2024-09-02 NOTE — DISCHARGE SUMMARY
Discharge Summary - Sheba Bowers 73 y.o. female MRN: [id number]    Unit/Bed#: [facility] CATH LAB ROOM Encounter: [id number]      Admission Date: 8/30/2024   Discharge Date: 9/2/2024    Discharge Diagnosis:   Early persistent atrial fibrillation  - anticoagulated with Eliquis / [id number] score of 4 (HTN, DM, age, sex)  - EF of 50-55% per echo 4/2024   - rate control: metoprolol prior to admission, currently held  - antiarrhythmic therapy: none prior to admission --- currently undergoing Sotalol initiation  - prior cardioversion: 4/5/2024 in [place]  - now status post ablation of atrial fibrillation with pulmonary vein isolation, posterior wall, and CTI line with PFA by Dr. Figueroa at 8/30/2024     Medtronic loop recorder in situ  - implanted at time of ablation on 8/30/2024      Acute on chronic HFpEF, with LVEF 50-55% per echo 4/2024  - mild volume overload noted in the post ablation setting, improved with IV diuretic  - maintained on torsemide and spironolactone as an outpatient, will be continued     Essential hypertension  - maintained on metoprolol as an outpatient along with diuretic     Insulin dependant diabetes mellitus     Obesity with BMI of 30      Procedures Performed:   Pulse field ablation of atrial fibrillation    Ablation 1-pulmonary vein isolation  Ablation 2-left atrial posterior wall isolation  2. Pulse field ablation of atrial flutter  Ablation 3-CTI dependent flutter ablation thank you any you know that  3. Limited electrophysiologic measurement   4. 3-D electro-anatomic mapping using NavX system  5. Intracardiac echocardiography  6. Transeptal  7. Access under USG with seldinger technique  8. Loop implant   Orders Placed This Encounter   Procedures    Cardiac ep lab eps/ablations       Consultants: None      HPI: Please refer to the initial history and physical as well as procedure notes for full details. Briefly, Sheba Bowers is a 73 y.o. year old female with persistent atrial fibrillation,  chronic diastolic congestive heart failure, essential pretension, insulin-dependent diabetes mellitus, obesity with BMI of 30, and anxiety.     Patient had been seen in the general cardiology office in May of this year after returning from [place] where she was treated for atrial fibrillation with cardioversion but reverted back to atrial fibrillation.  It was felt that she would benefit from electrophysiology evaluation and was seen by Dr. Figueroa in August of this year at which point ablation along with medication initiation along with loop recorder implantation was recommended. She presented this hospital admission to undergo this procedure and initiation of sotalol as it is more cost effective.     Hospital Course: Sheba Bowers presented at her [facility]. After the procedure was explained in detail and consent was obtained, she underwent ablation of atrial fibrillation and flutter with PFA along with loop recorder implantation without complications. She tolerated the procedure well. In the postprocedure setting, she was initiated on sotalol 80 mg twice daily after review of her QTC interval and creatinine clearance, and also given the fact that she is on venlafaxine at baseline.     On her first night, there were no acute issues or events overnight. The following morning she denied all cardiac complaints, including chest pain/pressure, dyspnea, palpitations, dizziness, lightheadedness, or syncope. Her vital signs were reviewed and labs are stable.  She had mild hypotension at time, however with subsequent spontaneous improvement with which she was asymptomatic.  Telemetry showed normal sinus rhythm without recurrent arrhythmia. Her groins were soft without significant hematoma or recurrent bleeding.    On POD #2 she reported chest pressure, lower extremity swelling, and symptoms consistent with mild fluid overload.  She was given IV Lasix with improvement, but remained in the hospital another 24  hours for ongoing monitoring.    On POD #3 there is reported nausea early in the morning, however by the time of our examination she felt better and was eager for discharge.  A chest x-ray was obtained which was not formally read but did not show significant signs of fluid overload.  Physical exam also did not reveal significant JVD and her lungs were clear.  As she is currently feeling better, she was deemed stable for discharge.    She underwent the first 6 doses of sotalol without complications. Serial EKGs were performed 2 hours after each dose of AAD.  There were no significant changes in QT/QTc with intiating doses.  There were no significant occurrence of ventricular ectopy on telemetry.  Electrolytes and renal function were monitored throughout her stay and remained stable.  Her hemoglobin and other counts are downtrending throughout her admission, however she was not tachycardic and her blood pressure remained stable.  It is recommended that this be monitored as an outpatient.    Review of Systems   Constitutional: Negative for fever and malaise/fatigue.   Cardiovascular:  Negative for chest pain, dyspnea on exertion, irregular heartbeat, leg swelling, near-syncope, orthopnea, palpitations, paroxysmal nocturnal dyspnea and syncope.   Gastrointestinal:  Positive for nausea.   All other systems reviewed and are negative.        On the day of discharge, physical exam was as follows:  GEN: NAD, alert and oriented x 3, well appearing  SKIN: dry without significant lesions or rashes  HEENT: NCAT, PERRL, EOMs intact  NECK: No JVD appreciated  CARDIOVASCULAR: RRR, normal S1, S2 without murmurs, rubs, or gallops appreciated  LUNGS: Clear to auscultation bilaterally without wheezes, rhonchi, or rales  ABDOMEN: Soft, nontender, nondistended  EXTREMITIES/VASCULAR: perfused without clubbing, cyanosis, or LE edema b/l  PSYCH: Normal mood and affect  NEURO: CN ll-Xll grossly intact    EKG on the day of discharge not  available yet in MUSE, but personally reviewed and showed stable QTc.      At time of discharge, patient was ambulating the cardiac unit without complaints of  lightheadedness, presyncope, syncope, chest pain, shortness of breath, orthopnea, PND, palpitations, or bleeding problems. Patient received education regarding sotalol - avoiding missed doses, pharmacy moitoring for drug to drug interactions, and concerning signs and symptoms that would prompt urgent evaluation.      She was given routine post ablation discharge instructions and restrictions, and these were explained in detail. She was given a one-week EKG appointment after starting sotalol (at the time of appointment with general cardiology NP) along with a follow up with Daniel Rodríguez PA-C in 4-6 weeks in the office. She was also instructed to follow up with her primary cardiologist as previously instructed.    In terms of her medications, her metoprolol was held and sotalol 80 mg twice daily was initiated.    She is stable for discharge at this time with all questions answered. She was discussed in detail with Dr. Figueroa who is in agreement with this discharge summary.       Discharge Medications:  See after visit summary for reconciled discharge medications provided to patient and family.      Medications Prior to Admission:     apixaban (ELIQUIS) 5 mg    cholestyramine (QUESTRAN) 4 g packet    insulin aspart (NovoLOG) 100 Units/mL injection pen    metFORMIN (GLUCOPHAGE) 500 mg tablet    metoprolol succinate (TOPROL-XL) 25 mg 24 hr tablet    multivitamin (THERAGRAN) TABS    spironolactone (ALDACTONE) 25 mg tablet    torsemide (DEMADEX) 5 MG tablet    venlafaxine (EFFEXOR-XR) 37.5 mg 24 hr capsule    venlafaxine (EFFEXOR-XR) 75 mg 24 hr capsule    Basaglar KwikPen 100 units/mL SOPN    BD Pen Needle Blanquita 2nd Gen 32G X 4 MM MISC      [facility]inet Labs/diagnostics:  CBC with diff:   Results from last 7 days   Lab Units 08/30/24  0850   WBC Thousand/uL 8.11    HEMOGLOBIN g/dL 13.9   HEMATOCRIT % 39.7   MCV fL 93   PLATELETS Thousands/uL 264   RBC Million/uL 4.26   MCH pg 32.6   MCHC g/dL 35.0   RDW % 12.3   MPV fL 9.9   NRBC AUTO /100 WBCs 0       BMP:  Results from last 7 days   Lab Units 08/30/24  0850   POTASSIUM mmol/L 4.2   CHLORIDE mmol/L 105   CO2 mmol/L 26   BUN mg/dL 19   CREATININE mg/dL 0.71   CALCIUM mg/dL 9.0       Magnesium:       Coags:   Results from last 7 days   Lab Units 08/30/24  0850   INR  1.20*         Complications: none    Condition at Discharge: good     Discharge instructions/Information to patient and family:   See after visit summary for information provided to patient and family.      Provisions for Follow-Up Care:  See after visit summary for information related to follow-up care and any pertinent home health orders.      Disposition: Home    Planned Readmission: No    Discharge Statement   I spent 45 minutes minutes discharging the patient. This time was spent on the day of discharge. I had direct contact with the patient on the day of discharge. Additional documentation is required if more than 30 minutes were spent on discharge. Evaluating the incision, discussing discharge instructions and restrictions, arranging follow up appointments, discussing medications

## 2024-09-09 ENCOUNTER — NURSE TRIAGE (OUTPATIENT)
Age: 74
End: 2024-09-09

## 2024-09-09 NOTE — TELEPHONE ENCOUNTER
"Reason for Disposition   Nursing judgment    Answer Assessment - Initial Assessment Questions  1. REASON FOR CALL or QUESTION: \"What is your reason for calling today?\" or \"How can I best help you?\" or \"What question do you have that I can help answer?\"        Pt called had an ablation recently, and is asking if can fly?    If yes when is it recommended and how long after     Please advise    Protocols used: Information Only Call - No Triage-ADULT-[place]    "

## 2024-09-13 ENCOUNTER — IN-CLINIC DEVICE VISIT (OUTPATIENT)
Dept: CARDIOLOGY CLINIC | Facility: CLINIC | Age: 74
End: 2024-09-13
Payer: MEDICARE

## 2024-09-13 DIAGNOSIS — I48.0 PAROXYSMAL A-FIB (HCC): Primary | ICD-10-CM

## 2024-09-13 PROCEDURE — 93298 REM INTERROG DEV EVAL SCRMS: CPT | Performed by: INTERNAL MEDICINE

## 2024-09-13 NOTE — PROGRESS NOTES
Results for orders placed or performed in visit on 09/13/24   Cardiac EP device report    Narrative    MDT LNQ22 ILR/ ACTIVE SYSTEM IS MRI CONDITIONAL  DEVICE INTERROGATED IN THE [place] OFFICE. BATTERY VOLTAGE ADEQUATE. NO PATIENT OR DEVICE ACTIVATED EPISODES. WOUND CHECK: INCISION CLEAN AND DRY WITH EDGES APPROXIMATED; SUTURES REMOVED; WOUND CARE AND RESTRICTIONS REVIEWED WITH PATIENT.--TYLER

## 2024-09-24 DIAGNOSIS — I48.0 PAROXYSMAL A-FIB (HCC): ICD-10-CM

## 2024-09-24 RX ORDER — SOTALOL HYDROCHLORIDE 80 MG/1
80 TABLET ORAL EVERY 12 HOURS
Qty: 180 TABLET | Refills: 3 | Status: SHIPPED | OUTPATIENT
Start: 2024-09-24

## 2024-09-24 NOTE — TELEPHONE ENCOUNTER
Requested medication(s) are due for refill today: Yes  Patient has already received a courtesy refill: No  Other reason request has been forwarded to provider: Requesting Rx be changed to 90 day supply. Thanks

## 2024-10-28 ENCOUNTER — TELEPHONE (OUTPATIENT)
Dept: CARDIOLOGY CLINIC | Facility: CLINIC | Age: 74
End: 2024-10-28

## 2024-10-28 NOTE — TELEPHONE ENCOUNTER
Caller: Sheba    Doctor: [place]     Reason for call: Patient calling to see about scheduling a virtual visit as she is in [place] and cannot come into the office.    Call back#: [phone]

## 2024-10-31 ENCOUNTER — TELEPHONE (OUTPATIENT)
Dept: CARDIOLOGY CLINIC | Facility: CLINIC | Age: 74
End: 2024-10-31

## 2025-03-31 ENCOUNTER — FOLLOW UP (OUTPATIENT)
Age: 75
End: 2025-03-31

## 2025-03-31 VITALS
WEIGHT: 150 LBS | HEIGHT: 61 IN | HEART RATE: 66 BPM | SYSTOLIC BLOOD PRESSURE: 112 MMHG | DIASTOLIC BLOOD PRESSURE: 60 MMHG | BODY MASS INDEX: 28.32 KG/M2

## 2025-03-31 DIAGNOSIS — E11.3313: ICD-10-CM

## 2025-03-31 DIAGNOSIS — H53.033: ICD-10-CM

## 2025-03-31 DIAGNOSIS — H43.393: ICD-10-CM

## 2025-03-31 DIAGNOSIS — H35.371: ICD-10-CM

## 2025-03-31 PROCEDURE — 92014 COMPRE OPH EXAM EST PT 1/>: CPT

## 2025-03-31 PROCEDURE — 92250 FUNDUS PHOTOGRAPHY W/I&R: CPT | Mod: 59

## 2025-03-31 PROCEDURE — 92235 FLUORESCEIN ANGRPH MLTIFRAME: CPT

## 2025-03-31 PROCEDURE — 92134 CPTRZ OPH DX IMG PST SGM RTA: CPT

## 2025-05-16 ENCOUNTER — OFFICE VISIT (OUTPATIENT)
Dept: CARDIOLOGY CLINIC | Facility: CLINIC | Age: 75
End: 2025-05-16
Payer: MEDICARE

## 2025-05-16 VITALS
DIASTOLIC BLOOD PRESSURE: 60 MMHG | HEIGHT: 61 IN | WEIGHT: 150 LBS | SYSTOLIC BLOOD PRESSURE: 138 MMHG | HEART RATE: 76 BPM | BODY MASS INDEX: 28.32 KG/M2

## 2025-05-16 DIAGNOSIS — I10 PRIMARY HYPERTENSION: ICD-10-CM

## 2025-05-16 DIAGNOSIS — I48.0 PAROXYSMAL A-FIB (HCC): Primary | ICD-10-CM

## 2025-05-16 DIAGNOSIS — I50.32 CHRONIC DIASTOLIC CONGESTIVE HEART FAILURE (HCC): ICD-10-CM

## 2025-05-16 DIAGNOSIS — E11.3313 TYPE 2 DIABETES MELLITUS WITH BOTH EYES AFFECTED BY MODERATE NONPROLIFERATIVE RETINOPATHY AND MACULAR EDEMA, WITHOUT LONG-TERM CURRENT USE OF INSULIN (HCC): ICD-10-CM

## 2025-05-16 PROCEDURE — G2211 COMPLEX E/M VISIT ADD ON: HCPCS | Performed by: INTERNAL MEDICINE

## 2025-05-16 PROCEDURE — 99214 OFFICE O/P EST MOD 30 MIN: CPT | Performed by: INTERNAL MEDICINE

## 2025-05-16 PROCEDURE — 93000 ELECTROCARDIOGRAM COMPLETE: CPT | Performed by: INTERNAL MEDICINE

## 2025-05-16 RX ORDER — TORSEMIDE 10 MG/1
10 TABLET ORAL DAILY PRN
Qty: 30 TABLET | Refills: 3 | Status: SHIPPED | OUTPATIENT
Start: 2025-05-16

## 2025-05-16 RX ORDER — VENLAFAXINE HYDROCHLORIDE 150 MG/1
CAPSULE, EXTENDED RELEASE ORAL
COMMUNITY
Start: 2025-04-09

## 2025-05-16 RX ORDER — TIRZEPATIDE 5 MG/.5ML
5 INJECTION, SOLUTION SUBCUTANEOUS
COMMUNITY
Start: 2025-03-19

## 2025-05-16 NOTE — PATIENT INSTRUCTIONS
Stop sotalol  We are changing torsemide to 10 mg daily as needed    Clarify with [place] cardiologist if they are getting your LOOP RECORDER transmissions.

## 2025-05-16 NOTE — PROGRESS NOTES
"Cardiology Outpatient Follow-Up Note - Sheba Bowers 74 y.o. female MRN: [id number]      Assessment/Plan:  1. Paroxysmal A-fib (HCC) (Primary)  No symptomatic recurrences  ILR following in [place]  Stop sotalol and monitor  Discussed apixaban, with [id number] of at least 4, would continue  - POCT ECG    2. Primary hypertension  Controlled on current therapy.    3. Type 2 diabetes mellitus with both eyes affected by moderate nonproliferative retinopathy and macular edema, without long-term current use of insulin (HCC)    4. Chronic diastolic congestive heart failure (HCC)  Euvolemic, has not been taking torsemide   Reduce torsemide to 10 mg daily PRN  Continue spironolactone 25 mg daily  - torsemide (DEMADEX) 10 mg tablet; Take 1 tablet (10 mg total) by mouth daily as needed (edema)  Dispense: 30 tablet; Refill: 3      We will see Sheba Bowers back in 12 months for routine follow-up.    Subjective:     HPI: Sheba Bowers is a 74 y.o. year old female with PAF s/p ablation by Dr. Figueroa 8/30/24, s/p ILR implant,     After ablation, Dr. Figueroa initiated patient on sotalol therapy, on which she remains.   We have no loop recorder interrogations since implant.   No symptomatic episodes since the month of her ablation.     Has been \"working out like you cannot believe\", at the gym 6 days per week. She feels very tired from it but no cardiopulmonary symptoms.     Cardiac Testing:      3D TISHA 8/30/24    Interpretation Summary  Show Result Comparison     Left Ventricle: Left ventricular cavity size is normal. Wall thickness is normal. The left ventricular ejection fraction is 50%. Systolic function is low normal. Wall motion is normal.    Right Ventricle: Systolic function is normal.    Left Atrium: The atrium is moderately dilated.    Right Atrium: The atrium is mildly dilated.    Atrial Septum: No patent foramen ovale detected, confirmed at rest using color doppler.    Left Atrial Appendage: There is normal function. " "There is no thrombus.    Aortic Valve: There is a 0.6 cm small, strand-like, mobile mass on the left coronary cusp, on the aortic aspect. 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    Mitral Valve: There is mild to moderate regurgitation.    Tricuspid Valve: There is mild regurgitation.     3D was performed for further investigation, better visualization,and additional quantification of the aortic and mitral valve.  Results from the utilization of 3D are listed in the report below.       EKGs, personally reviewed:  5/16/25 - NSR, 76 bpm, low voltage, nonspecific T wave abnormalities, abnormal study    Relevant Labs & Results:  BMP 9/2/24 - K 4.3, Cr 0.67      ROS:  Review of Systems:  Review of Systems    Objective:     Vitals:   Vitals:    05/16/25 1509   BP: 138/60   BP Location: Left arm   Patient Position: Sitting   Cuff Size: Standard   Pulse: 76   Weight: 68 kg (150 lb)   Height: 5' 1\" (1.549 m)    Body surface area is 1.67 meters squared.  Wt Readings from Last 3 Encounters:   05/16/25 68 kg (150 lb)   08/30/24 73.9 kg (163 lb)   05/22/24 72.1 kg (159 lb)       Physical Exam:  General: Sheba Bowers is a well appearing female, in no acute distress, sitting comfortably  HEENT: moist mucous membranes, EOMI  Neck:  No JVD, supple, trachea midline  Cardiovascular: unremarkable S1/S2, regular rate and rhythm, no murmurs, rubs or gallops  Pulmonary: normal respiratory effort, CTAB  Abdomen: soft and nondistended  Extremities: No lower extremity edema. Warm and well perfused extremities.  Neuro: no focal motor deficits, AAOx3 (person, place, time)  Psych: Normal mood and affect, cooperative      Medications (at the START of this encounter):  Medications Prior to Visit[1]              This note was completed in part utilizing [employer] voice recognition software. Grammatical errors, random word insertion, spelling mistakes, occasional wrong word or \"sound-alike\" substitutions and incomplete " sentences may be an occasional consequence of the system secondary to software limitations, ambient noise and hardware issues. At the time of dictation, efforts were made to edit, clarify and /or correct errors.  Please read the chart carefully and recognize, using context, where substitutions have occurred.  If you have any questions or concerns about the context, text or information contained within the body of this dictation, please contact myself, the provider, for further clarification.         [1]   Outpatient Medications Prior to Visit   Medication Sig Dispense Refill    apixaban (ELIQUIS) 5 mg Take 5 mg by mouth in the morning and 5 mg in the evening.      metFORMIN (GLUCOPHAGE) 500 mg tablet Take 500 mg by mouth in the morning and 500 mg in the evening. Take with meals.      multivitamin (THERAGRAN) TABS Take 1 tablet by mouth in the morning.      spironolactone (ALDACTONE) 25 mg tablet Take 25 mg by mouth in the morning.      Tirzepatide (Mounjaro) 5 MG/0.5ML SOAJ Inject 5 mg under the skin every 7 days      venlafaxine (EFFEXOR-XR) 150 mg 24 hr capsule TAKE 1 CAPSULE BY MOUTH DAILY (WITH BREAKFAST).      sotalol (BETAPACE) 80 mg tablet Take 1 tablet (80 mg total) by mouth every 12 (twelve) hours 180 tablet 3    Basaglar KwikPen 100 units/mL SOPN Inject 25 Units under the skin daily      BD Pen Needle Blanquita 2nd Gen 32G X 4 MM MISC INJECT 1 EACH INTO THE SKIN 4 TIMES DAILY.      cholestyramine (QUESTRAN) 4 g packet Take 4 g by mouth every other day      insulin aspart (NovoLOG) 100 Units/mL injection pen Inject 5 Units under the skin 3 (three) times a day before meals      torsemide (DEMADEX) 5 MG tablet Take 5 mg by mouth daily (Patient not taking: Reported on 5/16/2025)      venlafaxine (EFFEXOR-XR) 37.5 mg 24 hr capsule Take 37.5 mg by mouth daily      venlafaxine (EFFEXOR-XR) 75 mg 24 hr capsule Take 75 mg by mouth daily       No facility-administered medications prior to visit.

## 2025-06-07 DIAGNOSIS — I50.32 CHRONIC DIASTOLIC CONGESTIVE HEART FAILURE (HCC): ICD-10-CM

## 2025-06-08 RX ORDER — TORSEMIDE 10 MG/1
10 TABLET ORAL DAILY PRN
Qty: 90 TABLET | Refills: 0 | Status: SHIPPED | OUTPATIENT
Start: 2025-06-08

## (undated) DEVICE — REF PATCH ENSITE X

## (undated) DEVICE — CATH ULTRASOUND ACUNAV ICE 8FR 90CM GE VIVID-I

## (undated) DEVICE — CATH ABLATION FARAWAVE PULSED FIELD 31MM

## (undated) DEVICE — Device: Brand: PROTRACK PIGTAIL WIRE

## (undated) DEVICE — CATH EP 7FR DI-DIR 10-POLE DEFL CS 2-8-2 FJ

## (undated) DEVICE — 1 X VERSACROSS CONNECT TRANSSEPTAL DILATOR;  1 X VERSACROSS RF WIRE (INCLUDING 1 X CONNECTOR CABLE (SINGLE USE)): Brand: VERSACROSS CONNECT ACCESS SOLUTION FOR FARADRIVE

## (undated) DEVICE — CABLE CATH CONNECTION FARASTAR

## (undated) DEVICE — DGW .035 FC J3MM 150CM TEF: Brand: EMERALD

## (undated) DEVICE — SHEATH STEERABLE FARADRIVE

## (undated) DEVICE — PINNACLE INTRODUCER SHEATH: Brand: PINNACLE

## (undated) DEVICE — INTRO SHEATH 8FR 24CM ARROW-FLEX

## (undated) DEVICE — ROSEN CURVED WIRE GUIDE: Brand: ROSEN

## (undated) DEVICE — CATH EP ADVISOR HD GRID MAPPING 8F